# Patient Record
Sex: FEMALE | Race: WHITE | Employment: OTHER | URBAN - METROPOLITAN AREA
[De-identification: names, ages, dates, MRNs, and addresses within clinical notes are randomized per-mention and may not be internally consistent; named-entity substitution may affect disease eponyms.]

---

## 2018-05-16 ENCOUNTER — NURSE ONLY (OUTPATIENT)
Dept: NON INVASIVE DIAGNOSTICS | Age: 83
End: 2018-05-16
Payer: MEDICARE

## 2018-05-16 DIAGNOSIS — I48.0 PAROXYSMAL ATRIAL FIBRILLATION (HCC): Chronic | ICD-10-CM

## 2018-05-16 DIAGNOSIS — Z95.810 DUAL IMPLANTABLE CARDIOVERTER-DEFIBRILLATOR IN SITU: Primary | Chronic | ICD-10-CM

## 2018-05-16 DIAGNOSIS — I25.5 CARDIOMYOPATHY, ISCHEMIC: Chronic | ICD-10-CM

## 2018-05-16 PROCEDURE — 93296 REM INTERROG EVL PM/IDS: CPT | Performed by: INTERNAL MEDICINE

## 2018-05-16 PROCEDURE — 93297 REM INTERROG DEV EVAL ICPMS: CPT | Performed by: INTERNAL MEDICINE

## 2018-05-16 PROCEDURE — 93295 DEV INTERROG REMOTE 1/2/MLT: CPT | Performed by: INTERNAL MEDICINE

## 2018-05-23 ENCOUNTER — TELEPHONE (OUTPATIENT)
Dept: NON INVASIVE DIAGNOSTICS | Age: 83
End: 2018-05-23

## 2018-07-22 ENCOUNTER — APPOINTMENT (OUTPATIENT)
Dept: GENERAL RADIOLOGY | Age: 83
End: 2018-07-22
Payer: MEDICARE

## 2018-07-22 ENCOUNTER — HOSPITAL ENCOUNTER (EMERGENCY)
Age: 83
Discharge: HOME | End: 2018-07-22
Attending: EMERGENCY MEDICINE
Payer: MEDICARE

## 2018-07-22 VITALS
WEIGHT: 100 LBS | HEART RATE: 72 BPM | DIASTOLIC BLOOD PRESSURE: 66 MMHG | RESPIRATION RATE: 16 BRPM | BODY MASS INDEX: 20.16 KG/M2 | SYSTOLIC BLOOD PRESSURE: 123 MMHG | OXYGEN SATURATION: 96 % | HEIGHT: 59 IN | TEMPERATURE: 97.5 F

## 2018-07-22 DIAGNOSIS — N30.00 ACUTE CYSTITIS WITHOUT HEMATURIA: Primary | ICD-10-CM

## 2018-07-22 LAB
ANION GAP SERPL CALCULATED.3IONS-SCNC: 10 MMOL/L (ref 7–16)
BACTERIA: ABNORMAL /HPF
BILIRUBIN URINE: NEGATIVE
BLOOD, URINE: ABNORMAL
BUN BLDV-MCNC: 18 MG/DL (ref 8–23)
CALCIUM SERPL-MCNC: 8.6 MG/DL (ref 8.6–10.2)
CHLORIDE BLD-SCNC: 96 MMOL/L (ref 98–107)
CLARITY: CLEAR
CO2: 29 MMOL/L (ref 22–29)
COLOR: YELLOW
CREAT SERPL-MCNC: 0.7 MG/DL (ref 0.5–1)
DIGOXIN LEVEL: 0.8 NG/ML (ref 0.8–2)
EKG ATRIAL RATE: 64 BPM
EKG P-R INTERVAL: 246 MS
EKG Q-T INTERVAL: 372 MS
EKG QRS DURATION: 92 MS
EKG QTC CALCULATION (BAZETT): 377 MS
EKG R AXIS: 83 DEGREES
EKG T AXIS: 128 DEGREES
EKG VENTRICULAR RATE: 62 BPM
GFR AFRICAN AMERICAN: >60
GFR NON-AFRICAN AMERICAN: >60 ML/MIN/1.73
GLUCOSE BLD-MCNC: 116 MG/DL (ref 74–109)
GLUCOSE URINE: NEGATIVE MG/DL
HCT VFR BLD CALC: 38 % (ref 34–48)
HEMOGLOBIN: 12.4 G/DL (ref 11.5–15.5)
INR BLD: 2.6
KETONES, URINE: NEGATIVE MG/DL
LEUKOCYTE ESTERASE, URINE: ABNORMAL
MCH RBC QN AUTO: 29.2 PG (ref 26–35)
MCHC RBC AUTO-ENTMCNC: 32.6 % (ref 32–34.5)
MCV RBC AUTO: 89.6 FL (ref 80–99.9)
NITRITE, URINE: POSITIVE
PDW BLD-RTO: 14.3 FL (ref 11.5–15)
PH UA: 7 (ref 5–9)
PLATELET # BLD: 221 E9/L (ref 130–450)
PMV BLD AUTO: 9.4 FL (ref 7–12)
POTASSIUM SERPL-SCNC: 3.8 MMOL/L (ref 3.5–5)
PROTEIN UA: NEGATIVE MG/DL
PROTHROMBIN TIME: 30 SEC (ref 9.3–12.4)
RBC # BLD: 4.24 E12/L (ref 3.5–5.5)
RBC UA: ABNORMAL /HPF (ref 0–2)
SODIUM BLD-SCNC: 135 MMOL/L (ref 132–146)
SPECIFIC GRAVITY UA: 1.01 (ref 1–1.03)
UROBILINOGEN, URINE: 0.2 E.U./DL
WBC # BLD: 10.9 E9/L (ref 4.5–11.5)
WBC UA: >20 /HPF (ref 0–5)
YEAST: ABNORMAL

## 2018-07-22 PROCEDURE — 71045 X-RAY EXAM CHEST 1 VIEW: CPT

## 2018-07-22 PROCEDURE — 85610 PROTHROMBIN TIME: CPT

## 2018-07-22 PROCEDURE — 36415 COLL VENOUS BLD VENIPUNCTURE: CPT

## 2018-07-22 PROCEDURE — 99285 EMERGENCY DEPT VISIT HI MDM: CPT

## 2018-07-22 PROCEDURE — 81001 URINALYSIS AUTO W/SCOPE: CPT

## 2018-07-22 PROCEDURE — 6370000000 HC RX 637 (ALT 250 FOR IP): Performed by: STUDENT IN AN ORGANIZED HEALTH CARE EDUCATION/TRAINING PROGRAM

## 2018-07-22 PROCEDURE — 80162 ASSAY OF DIGOXIN TOTAL: CPT

## 2018-07-22 PROCEDURE — 2580000003 HC RX 258: Performed by: STUDENT IN AN ORGANIZED HEALTH CARE EDUCATION/TRAINING PROGRAM

## 2018-07-22 PROCEDURE — 80048 BASIC METABOLIC PNL TOTAL CA: CPT

## 2018-07-22 PROCEDURE — 85027 COMPLETE CBC AUTOMATED: CPT

## 2018-07-22 RX ORDER — SODIUM CHLORIDE 0.9 % (FLUSH) 0.9 %
10 SYRINGE (ML) INJECTION PRN
Status: DISCONTINUED | OUTPATIENT
Start: 2018-07-22 | End: 2018-07-23 | Stop reason: HOSPADM

## 2018-07-22 RX ORDER — SODIUM CHLORIDE 9 MG/ML
INJECTION, SOLUTION INTRAVENOUS ONCE
Status: COMPLETED | OUTPATIENT
Start: 2018-07-22 | End: 2018-07-22

## 2018-07-22 RX ORDER — CEFDINIR 300 MG/1
300 CAPSULE ORAL 2 TIMES DAILY
Qty: 14 CAPSULE | Refills: 0 | Status: SHIPPED | OUTPATIENT
Start: 2018-07-22 | End: 2018-07-29

## 2018-07-22 RX ORDER — CEFDINIR 300 MG/1
300 CAPSULE ORAL ONCE
Status: COMPLETED | OUTPATIENT
Start: 2018-07-22 | End: 2018-07-22

## 2018-07-22 RX ADMIN — CEFDINIR 300 MG: 300 CAPSULE ORAL at 23:38

## 2018-07-22 RX ADMIN — SODIUM CHLORIDE: 9 INJECTION, SOLUTION INTRAVENOUS at 21:45

## 2018-07-22 ASSESSMENT — ENCOUNTER SYMPTOMS
ABDOMINAL PAIN: 0
COLOR CHANGE: 0
VOMITING: 0
PHOTOPHOBIA: 0
DIARRHEA: 0
NAUSEA: 0
SHORTNESS OF BREATH: 0
COUGH: 0

## 2018-07-23 NOTE — ED PROVIDER NOTES
107 mmol/L    CO2 29 22 - 29 mmol/L    Anion Gap 10 7 - 16 mmol/L    Glucose 116 (H) 74 - 109 mg/dL    BUN 18 8 - 23 mg/dL    CREATININE 0.7 0.5 - 1.0 mg/dL    GFR Non-African American >60 >=60 mL/min/1.73    GFR African American >60     Calcium 8.6 8.6 - 10.2 mg/dL   Digoxin level   Result Value Ref Range    Digoxin Lvl 0.8 0.8 - 2.0 ng/mL   Protime-INR   Result Value Ref Range    Protime 30.0 (H) 9.3 - 12.4 sec    INR 2.6    Urinalysis with Microscopic   Result Value Ref Range    Color, UA Yellow Straw/Yellow    Clarity, UA Clear Clear    Glucose, Ur Negative Negative mg/dL    Bilirubin Urine Negative Negative    Ketones, Urine Negative Negative mg/dL    Specific Gravity, UA 1.010 1.005 - 1.030    Blood, Urine TRACE-LYSED Negative    pH, UA 7.0 5.0 - 9.0    Protein, UA Negative Negative mg/dL    Urobilinogen, Urine 0.2 <2.0 E.U./dL    Nitrite, Urine POSITIVE (A) Negative    Leukocyte Esterase, Urine LARGE (A) Negative    WBC, UA >20 0 - 5 /HPF    RBC, UA 1-3 0 - 2 /HPF    Bacteria, UA MODERATE (A) /HPF    Yeast, UA RARE    EKG 12 Lead   Result Value Ref Range    Ventricular Rate 60 BPM    Atrial Rate 47 BPM    QRS Duration 82 ms    Q-T Interval 392 ms    QTc Calculation (Bazett) 392 ms    P Axis 15 degrees    R Axis 70 degrees    T Axis 66 degrees       Radiology:  XR CHEST 1 VW   Final Result   Advanced degenerative changes lung parenchyma with some   discrete ill-defined patchy infiltrates of undetermined age. Can   further evaluate with a CT scan of the chest.          ------------------------- NURSING NOTES AND VITALS REVIEWED ---------------------------  Date / Time Roomed:  7/22/2018  7:46 PM  ED Bed Assignment:  05/05    The nursing notes within the ED encounter and vital signs as below have been reviewed.    /66   Pulse 72   Temp 97.5 °F (36.4 °C) (Oral)   Resp 16   Ht 4' 11\" (1.499 m)   Wt 100 lb (45.4 kg)   SpO2 96%   BMI 20.20 kg/m²   Oxygen Saturation Interpretation: Normal      ------------------------------------------ PROGRESS NOTES ------------------------------------------  10:39 PM  I have spoken with the patient and discussed todays results, in addition to providing specific details for the plan of care and counseling regarding the diagnosis and prognosis. Their questions are answered at this time and they are agreeable with the plan. I discussed at length with them reasons for immediate return here for re evaluation. They will followup with their primary care physician by calling their office tomorrow. --------------------------------- ADDITIONAL PROVIDER NOTES ---------------------------------  At this time the patient is without objective evidence of an acute process requiring hospitalization or inpatient management. They have remained hemodynamically stable throughout their entire ED visit and are stable for discharge with outpatient follow-up. The plan has been discussed in detail and they are aware of the specific conditions for emergent return, as well as the importance of follow-up. New Prescriptions    CEFDINIR (OMNICEF) 300 MG CAPSULE    Take 1 capsule by mouth 2 times daily for 7 days       Diagnosis:  1. Acute cystitis without hematuria        Disposition:  Patient's disposition: Discharge to home  Patient's condition is stable.        Eb Washington,   Resident  07/22/18 [id number]

## 2018-09-18 NOTE — PROGRESS NOTES
[facility] [place] Cardiac Electrophysiology Office Progress Note    Kaur Chapin  1935  Date of Service: 9/24/18  [address]  Electrophysiologist: Gill Yancey DO    SUBJECTIVE: Kaur Chapin presents to the office today for a 6 month follow-up and the medical management of PAF, Sotalol AAD therapy and ICD in situ. At our last visit we discussed the change in her code status to [facility]: no intubation or chest compressions and was agreeable to defibrillation and medications. We discussed her ICD/ICD therapies and if she decides no defibrillation we will turn off ICD therapies. She remains on Sotalol with a stable QTc. She is also on coumadin with no bleeding issues. She recently had a UTI but is feeling better. She is very this and the skin around her ICD is intact. Her grandson is present and they were instructed to be diligent in observing the skin surrounding the device. She currently denies angina, syncope, dyspnea on exertion, paroxysmal nocturnal dyspnea, ICD shock and palpitations. Patient Active Problem List    Diagnosis Date Noted    Colitis 02/04/2013    Numbness 11/13/2012    Hand pain 11/13/2012    Cardiomyopathy, ischemic 06/14/2011     Overview Note:     EF: 35%      MI (myocardial infarction) ([facility] 06/14/2011     Overview Note:     8/2009      Dual implantable cardioverter-defibrillator in situ 06/14/2011     Overview Note:     [address] [place] [address] , Serial # [id number] implantation 9/4/09   A) Atrial Lead: 5076, Serial # [id number]   B) RV lead: 4074, Serial # [id number]  replace inactive diagnosis      Paroxysmal atrial fibrillation ([facility] 06/14/2011     Overview Note:     1.  Chronic Sotalol Therapy      Anticoagulant long-term use 06/14/2011    Breast carcinoma ([facility] 06/14/2011    S/P bilateral mastectomy 06/14/2011    Thyroid cancer ([facility] 06/14/2011    S/P thyroidectomy 06/14/2011    Hypothyroidism 06/14/2011    HTN (hypertension), benign 06/14/2011    TIA normal.   ICD site: well healed, skin thin and intact, no noted abrasion/open areas    EKG: (9/24/18): SR, AP-VS, HR 62 bpm,  QTc: 406 ms.     2D echo 2/7/16:  Conclusions      Summary   Mildly reduced left ventricular systolic function   Ejection fraction biplane = 45%.   Prominent trabeculation   Apical hypokinesis   Normal left atrium.   Physiologic and/or trace, Mild mitral regurgitation   The aortic valve is trileaflet.   The aortic valve appears mildly sclerotic.   No pulmonary hypertension   No tamponade      Signature      ----------------------------------------------------------------   Electronically signed by Arthur Jj MD(Interpreting   physician) on 07/11/2016 07:30 AM   ----------------------------------------------------------------     M-Mode/2D Measurements & Calculations      LV Diastolic     LV Systolic Dimension: 2.2 cm   AV Cusp Separation: 1.7   Dimension: 2.9   LV Volume Diastolic: 95.7 ml    cmAO Root Dimension: 2.9   cm               LV Volume Systolic: 12.9 ml     cm   LV FS:24.1 %     LV EDV/LV EDV Index: 32.2 LL/94   LV PW Diastolic: XZ/Y^0AN ESV/LV ESV Index: 17.1   0.9 cm           ml/12ml/ m^2   LV PW Systolic:  EF Calculated: 46.9 %   1.2 cm           LV Mass Index: 58 l/min*m^2     LA/Aorta: 1.34   Septum   Diastolic: 1.2                                   LA volume/Index: 44.3 ml   cm               LVOT: 1.9 cm                    /56ZM/U^6   Septum Systolic:                                 RA Area: 10 cm^2   1.2 cm                                                    IVC Expiration: 1.6 cm   LV Mass: 84.31 g     Doppler Measurements & Calculations      MV Peak E-Wave:   AV Peak Velocity: 1.16 m/s    LVOT Peak Velocity: 0.83   0.75 m/s          AV Peak Gradient: 5.42 mmHg   m/s   MV Peak A-Wave:   AV Mean Velocity: 0.78 m/s    LVOT Mean Velocity: 0.49   0.94 m/s          AV Mean Gradient: 2.7 mmHg    m/s   MV E/A Ratio:     AV VTI: 22.5 cm               LVOT Peak Gradient: 2.7   0.81              AV Area (Continuity):1.85     mmHgLVOT Mean Gradient:   MV Peak Gradient: cm^2                          1.1 mmHg   2.8 mmHg                                        Estimated RVSP: 33.56 mmHg   MV Mean Gradient: LVOT VTI: 14.7 cm             Estimated RAP:3 mmHg   1.1 mmHg          IVRT: 101.5 msec   MV Mean Velocity: Estimated PADP: 10 mmHg   0.46 m/s          Pulm. Vein A Reversal         TR Velocity:2.76 m/s   MV Deceleration   Duration:138.4 msec           TR Gradient:30.56 mmHg   Time: 217.2 msec  Pulm. Vein D Velocity:0.45    PV Peak Velocity: 0.9 m/s   MV P1/2t: 55.9    m/sPulm. Vein A Reversal      PV Peak Gradient: 3.22   msec              Velocity:0.28 m/s             mmHg   MVA by PHT:3.94   Pulm. Vein S Velocity: 0.46   PV Mean Velocity: 0.56 m/s   cm^2              m/s                           PV Mean Gradient: 1.5 mmHg   MV Area   (continuity): 1.9                               MT ED Velocity: 1.35 m/s   cm^2   MV E' Septal   Velocity: 0.05   m/s   MV E' Lateral   Velocity: 0.04   m/s    Device Interrogation/Reprogramming  Make/Model Medtronic Evera XT. DOI: 8/25/16  Mode MVPR  60/120  P wave: 4.1 mV  Impedance: 399 ohms   Threshold: 0.5 V @ 0.4 ms  RV R wave: 10 mV  Impedance: 380 ohms   Threshold: 0.75 V @ 0.4 ms  Pacing: A: 95%  RV: <0.1%    Battery Voltage/Longevity: 3 V, 8.2 years, charge time 3.7 seconds   Arrhythmias: none  OptiVol: At baseline. Overall device function is normal  All device programmable settings were evaluated per above and in the scanned document, along with iterative adjustments (capture thresholds) to assess and select the most appropriate final programming to provide for consistent delivery of the appropriate therapy and to verify function of the device. Impression:    1. ICMP  - EF: 35%  - repeat echo 7/10/16:  Ejection fraction biplane = 45%. 2. Dual chamber ICD in situ  - Original implant: Medtronic.  DOI 9/4/09  - ICD generator change

## 2018-09-24 ENCOUNTER — OFFICE VISIT (OUTPATIENT)
Dept: NON INVASIVE DIAGNOSTICS | Age: 83
End: 2018-09-24
Payer: MEDICARE

## 2018-09-24 VITALS
HEART RATE: 62 BPM | HEIGHT: 59 IN | BODY MASS INDEX: 19.35 KG/M2 | RESPIRATION RATE: 16 BRPM | SYSTOLIC BLOOD PRESSURE: 130 MMHG | DIASTOLIC BLOOD PRESSURE: 80 MMHG | WEIGHT: 96 LBS

## 2018-09-24 DIAGNOSIS — Z95.810 DUAL IMPLANTABLE CARDIOVERTER-DEFIBRILLATOR IN SITU: Chronic | ICD-10-CM

## 2018-09-24 DIAGNOSIS — I48.0 PAROXYSMAL ATRIAL FIBRILLATION (HCC): Chronic | ICD-10-CM

## 2018-09-24 DIAGNOSIS — I25.5 CARDIOMYOPATHY, ISCHEMIC: Primary | Chronic | ICD-10-CM

## 2018-09-24 PROCEDURE — 1036F TOBACCO NON-USER: CPT | Performed by: NURSE PRACTITIONER

## 2018-09-24 PROCEDURE — 93290 INTERROG DEV EVAL ICPMS IP: CPT | Performed by: NURSE PRACTITIONER

## 2018-09-24 PROCEDURE — G8598 ASA/ANTIPLAT THER USED: HCPCS | Performed by: NURSE PRACTITIONER

## 2018-09-24 PROCEDURE — G8427 DOCREV CUR MEDS BY ELIG CLIN: HCPCS | Performed by: NURSE PRACTITIONER

## 2018-09-24 PROCEDURE — 1101F PT FALLS ASSESS-DOCD LE1/YR: CPT | Performed by: NURSE PRACTITIONER

## 2018-09-24 PROCEDURE — G8400 PT W/DXA NO RESULTS DOC: HCPCS | Performed by: NURSE PRACTITIONER

## 2018-09-24 PROCEDURE — 93283 PRGRMG EVAL IMPLANTABLE DFB: CPT | Performed by: NURSE PRACTITIONER

## 2018-09-24 PROCEDURE — 1123F ACP DISCUSS/DSCN MKR DOCD: CPT | Performed by: NURSE PRACTITIONER

## 2018-09-24 PROCEDURE — 99213 OFFICE O/P EST LOW 20 MIN: CPT | Performed by: NURSE PRACTITIONER

## 2018-09-24 PROCEDURE — 93000 ELECTROCARDIOGRAM COMPLETE: CPT | Performed by: INTERNAL MEDICINE

## 2018-09-24 PROCEDURE — G8420 CALC BMI NORM PARAMETERS: HCPCS | Performed by: NURSE PRACTITIONER

## 2018-09-24 PROCEDURE — 1090F PRES/ABSN URINE INCON ASSESS: CPT | Performed by: NURSE PRACTITIONER

## 2018-09-24 PROCEDURE — 4040F PNEUMOC VAC/ADMIN/RCVD: CPT | Performed by: NURSE PRACTITIONER

## 2018-09-24 ASSESSMENT — ENCOUNTER SYMPTOMS: RESPIRATORY NEGATIVE: 1

## 2018-11-12 ENCOUNTER — NURSE ONLY (OUTPATIENT)
Dept: NON INVASIVE DIAGNOSTICS | Age: 83
End: 2018-11-12
Payer: MEDICARE

## 2018-11-12 DIAGNOSIS — Z95.810 DUAL IMPLANTABLE CARDIOVERTER-DEFIBRILLATOR IN SITU: Primary | Chronic | ICD-10-CM

## 2018-11-12 DIAGNOSIS — I25.5 CARDIOMYOPATHY, ISCHEMIC: Chronic | ICD-10-CM

## 2018-11-12 DIAGNOSIS — I48.0 PAROXYSMAL ATRIAL FIBRILLATION (HCC): Chronic | ICD-10-CM

## 2018-11-12 PROCEDURE — 93295 DEV INTERROG REMOTE 1/2/MLT: CPT | Performed by: INTERNAL MEDICINE

## 2018-11-12 PROCEDURE — 93296 REM INTERROG EVL PM/IDS: CPT | Performed by: INTERNAL MEDICINE

## 2018-11-12 PROCEDURE — 93297 REM INTERROG DEV EVAL ICPMS: CPT | Performed by: INTERNAL MEDICINE

## 2018-11-16 ENCOUNTER — TELEPHONE (OUTPATIENT)
Dept: CARDIOLOGY CLINIC | Age: 83
End: 2018-11-16

## 2018-11-16 NOTE — PROGRESS NOTES
See [facility] [place] report. Remote monitoring reviewed over a 90 day period. End of 90 day monitoring period date of service 11.12.2018.

## 2019-02-11 ENCOUNTER — TELEPHONE (OUTPATIENT)
Dept: NON INVASIVE DIAGNOSTICS | Age: 84
End: 2019-02-11

## 2019-02-11 ENCOUNTER — NURSE ONLY (OUTPATIENT)
Dept: NON INVASIVE DIAGNOSTICS | Age: 84
End: 2019-02-11
Payer: MEDICARE

## 2019-02-11 DIAGNOSIS — Z95.810 DUAL IMPLANTABLE CARDIOVERTER-DEFIBRILLATOR IN SITU: Primary | Chronic | ICD-10-CM

## 2019-02-11 DIAGNOSIS — I25.5 CARDIOMYOPATHY, ISCHEMIC: Chronic | ICD-10-CM

## 2019-02-11 DIAGNOSIS — I48.0 PAROXYSMAL ATRIAL FIBRILLATION (HCC): Chronic | ICD-10-CM

## 2019-02-11 PROCEDURE — 93295 DEV INTERROG REMOTE 1/2/MLT: CPT | Performed by: INTERNAL MEDICINE

## 2019-02-11 PROCEDURE — 93297 REM INTERROG DEV EVAL ICPMS: CPT | Performed by: INTERNAL MEDICINE

## 2019-02-11 PROCEDURE — 93296 REM INTERROG EVL PM/IDS: CPT | Performed by: INTERNAL MEDICINE

## 2019-04-25 LAB
CHOLESTEROL, TOTAL: NORMAL
CHOLESTEROL/HDL RATIO: NORMAL
CREATININE: 0.8 MG/DL
HDLC SERPL-MCNC: NORMAL MG/DL
LDL CHOLESTEROL CALCULATED: NORMAL
POTASSIUM (K+): 4.2
TRIGL SERPL-MCNC: NORMAL MG/DL
VLDLC SERPL CALC-MCNC: NORMAL MG/DL

## 2019-05-13 ENCOUNTER — NURSE ONLY (OUTPATIENT)
Dept: NON INVASIVE DIAGNOSTICS | Age: 84
End: 2019-05-13
Payer: MEDICARE

## 2019-05-13 DIAGNOSIS — I25.5 CARDIOMYOPATHY, ISCHEMIC: ICD-10-CM

## 2019-05-13 DIAGNOSIS — Z95.810 DUAL IMPLANTABLE CARDIOVERTER-DEFIBRILLATOR IN SITU: Primary | ICD-10-CM

## 2019-05-13 DIAGNOSIS — I48.0 PAROXYSMAL ATRIAL FIBRILLATION (HCC): ICD-10-CM

## 2019-05-13 PROCEDURE — 93297 REM INTERROG DEV EVAL ICPMS: CPT | Performed by: INTERNAL MEDICINE

## 2019-05-13 PROCEDURE — 93295 DEV INTERROG REMOTE 1/2/MLT: CPT | Performed by: INTERNAL MEDICINE

## 2019-05-13 PROCEDURE — 93296 REM INTERROG EVL PM/IDS: CPT | Performed by: INTERNAL MEDICINE

## 2019-05-22 ENCOUNTER — TELEPHONE (OUTPATIENT)
Dept: NON INVASIVE DIAGNOSTICS | Age: 84
End: 2019-05-22

## 2019-05-22 NOTE — TELEPHONE ENCOUNTER
We have received your remote transmission. Our staff will contact you if there is anything that needs to be discussed. Your next appointment is 08/12/2019 remote transmission from home    Pt is wireless.

## 2019-05-22 NOTE — TELEPHONE ENCOUNTER
----- Message from Soledad Murphy RN sent at 5/22/2019  1:59 PM EDT -----  Successful transmission received. Please call patient and give next appointment.

## 2019-05-30 ENCOUNTER — OFFICE VISIT (OUTPATIENT)
Dept: FAMILY MEDICINE CLINIC | Age: 84
End: 2019-05-30
Payer: MEDICARE

## 2019-05-30 DIAGNOSIS — Z79.01 ANTICOAGULANT LONG-TERM USE: Primary | Chronic | ICD-10-CM

## 2019-05-30 DIAGNOSIS — I48.0 PAROXYSMAL ATRIAL FIBRILLATION (HCC): Chronic | ICD-10-CM

## 2019-05-30 DIAGNOSIS — F41.9 ANXIETY: ICD-10-CM

## 2019-05-30 LAB
INTERNATIONAL NORMALIZATION RATIO, POC: 2.4
PROTHROMBIN TIME, POC: 27.6

## 2019-05-30 PROCEDURE — 85610 PROTHROMBIN TIME: CPT | Performed by: INTERNAL MEDICINE

## 2019-05-30 PROCEDURE — 99213 OFFICE O/P EST LOW 20 MIN: CPT | Performed by: INTERNAL MEDICINE

## 2019-05-30 RX ORDER — WARFARIN SODIUM 1 MG/1
TABLET ORAL
Qty: 30 TABLET | Refills: 3 | Status: SHIPPED | OUTPATIENT
Start: 2019-05-30 | End: 2019-10-07 | Stop reason: SDUPTHER

## 2019-05-30 RX ORDER — DIGOXIN 125 MCG
125 TABLET ORAL DAILY
Qty: 30 TABLET | Refills: 5 | Status: SHIPPED | OUTPATIENT
Start: 2019-05-30 | End: 2019-12-10 | Stop reason: SDUPTHER

## 2019-05-30 RX ORDER — ROSUVASTATIN CALCIUM 20 MG/1
20 TABLET, COATED ORAL EVERY OTHER DAY
Qty: 30 TABLET | Refills: 0 | Status: SHIPPED | OUTPATIENT
Start: 2019-05-30 | End: 2019-12-30 | Stop reason: SDUPTHER

## 2019-05-30 RX ORDER — ALPRAZOLAM 0.25 MG/1
0.25 TABLET ORAL 2 TIMES DAILY
Qty: 60 TABLET | Refills: 2 | Status: SHIPPED | OUTPATIENT
Start: 2019-05-30 | End: 2019-06-29

## 2019-05-30 RX ORDER — ALPRAZOLAM 0.25 MG/1
0.25 TABLET ORAL 2 TIMES DAILY
Qty: 60 TABLET | Refills: 2 | Status: SHIPPED | OUTPATIENT
Start: 2019-05-30 | End: 2019-05-30 | Stop reason: SDUPTHER

## 2019-05-30 ASSESSMENT — PATIENT HEALTH QUESTIONNAIRE - PHQ9
1. LITTLE INTEREST OR PLEASURE IN DOING THINGS: 0
SUM OF ALL RESPONSES TO PHQ QUESTIONS 1-9: 0
SUM OF ALL RESPONSES TO PHQ QUESTIONS 1-9: 0
SUM OF ALL RESPONSES TO PHQ9 QUESTIONS 1 & 2: 0
2. FEELING DOWN, DEPRESSED OR HOPELESS: 0

## 2019-07-02 ENCOUNTER — ANTI-COAG VISIT (OUTPATIENT)
Dept: FAMILY MEDICINE CLINIC | Age: 84
End: 2019-07-02
Payer: MEDICARE

## 2019-07-02 VITALS
OXYGEN SATURATION: 94 % | BODY MASS INDEX: 19.32 KG/M2 | HEIGHT: 62 IN | SYSTOLIC BLOOD PRESSURE: 118 MMHG | HEART RATE: 84 BPM | DIASTOLIC BLOOD PRESSURE: 70 MMHG | WEIGHT: 105 LBS

## 2019-07-02 DIAGNOSIS — Z79.01 ANTICOAGULANT LONG-TERM USE: Chronic | ICD-10-CM

## 2019-07-02 DIAGNOSIS — I48.0 PAROXYSMAL ATRIAL FIBRILLATION (HCC): Primary | Chronic | ICD-10-CM

## 2019-07-02 LAB
INTERNATIONAL NORMALIZATION RATIO, POC: 3.1
PROTHROMBIN TIME, POC: 37

## 2019-07-02 PROCEDURE — 85610 PROTHROMBIN TIME: CPT | Performed by: INTERNAL MEDICINE

## 2019-07-02 PROCEDURE — 99212 OFFICE O/P EST SF 10 MIN: CPT | Performed by: INTERNAL MEDICINE

## 2019-08-08 ENCOUNTER — ANTI-COAG VISIT (OUTPATIENT)
Dept: FAMILY MEDICINE CLINIC | Age: 84
End: 2019-08-08
Payer: MEDICARE

## 2019-08-08 VITALS
HEIGHT: 63 IN | OXYGEN SATURATION: 96 % | WEIGHT: 104.6 LBS | HEART RATE: 88 BPM | BODY MASS INDEX: 18.54 KG/M2 | SYSTOLIC BLOOD PRESSURE: 112 MMHG | TEMPERATURE: 97.2 F | DIASTOLIC BLOOD PRESSURE: 72 MMHG

## 2019-08-08 DIAGNOSIS — I10 HTN (HYPERTENSION), BENIGN: Chronic | ICD-10-CM

## 2019-08-08 DIAGNOSIS — I48.0 PAROXYSMAL ATRIAL FIBRILLATION (HCC): Primary | Chronic | ICD-10-CM

## 2019-08-08 LAB — INTERNATIONAL NORMALIZATION RATIO, POC: 2.8

## 2019-08-08 PROCEDURE — 85610 PROTHROMBIN TIME: CPT | Performed by: INTERNAL MEDICINE

## 2019-08-08 PROCEDURE — 99213 OFFICE O/P EST LOW 20 MIN: CPT | Performed by: INTERNAL MEDICINE

## 2019-08-08 RX ORDER — ALPRAZOLAM 0.25 MG/1
TABLET ORAL
COMMUNITY
Start: 2019-08-07 | End: 2019-09-05 | Stop reason: SDUPTHER

## 2019-08-08 RX ORDER — LEVOTHYROXINE SODIUM 0.07 MG/1
75 TABLET ORAL DAILY
Qty: 90 TABLET | Refills: 1 | Status: SHIPPED | OUTPATIENT
Start: 2019-08-08 | End: 2019-12-30 | Stop reason: SDUPTHER

## 2019-08-08 RX ORDER — SOTALOL HYDROCHLORIDE 80 MG/1
80 TABLET ORAL 2 TIMES DAILY
Qty: 180 TABLET | Refills: 1 | Status: SHIPPED | OUTPATIENT
Start: 2019-08-08 | End: 2019-12-30 | Stop reason: SDUPTHER

## 2019-08-08 NOTE — PROGRESS NOTES
2019     Mitchell Begum (:  1935) is a 80 y.o. female, resents for medication refills today and also recheck of her INR. States her sinuses have been doing fine with her allergic rhinitis. They do have questions about using CBD oil for her arthritis which I told her would be fine topically. Review of Systems    Prior to Visit Medications    Medication Sig Taking? Authorizing Provider   ALPRAZolam Shamar Juárez) 0.25 MG tablet   Historical Provider, MD   rosuvastatin (CRESTOR) 20 MG tablet Take 1 tablet by mouth every other day  Neill Felty,    digoxin (LANOXIN) 125 MCG tablet Take 1 tablet by mouth daily  Neill Felty, DO   warfarin (COUMADIN) 1 MG tablet As directed by pcp  Neill Felty, DO   lisinopril (PRINIVIL;ZESTRIL) 2.5 MG tablet Take 2.5 mg by mouth 2 times daily   Historical Provider, MD   levothyroxine (SYNTHROID) 75 MCG tablet Take 75 mcg by mouth daily.     Historical Provider, MD   sotalol (BETAPACE) 80 MG tablet Take 80 mg by mouth 2 times daily   Historical Provider, MD   furosemide (LASIX) 20 MG tablet Take 20 mg by mouth daily   Historical Provider, MD      Allergies   Allergen Reactions    Fluarix Quadrivalent  [Influenza Vac Split Quad]     Ipratropium     Pcn [Penicillins]     Pregabalin Other (See Comments)     confusion    Codeine Nausea And Vomiting       Past Medical History:   Diagnosis Date    Arm pain     lower arms    Atrial fibrillation (HCC)     Cancer ([facility]     bilateral breasts and thyroid    Cardiac defibrillator in place 2009    Dr. Xena Johnston [phone]    CHF (congestive heart failure) ([facility]     Hand pain     bilateral    Hypothyroidism     Ischemic cardiomyopathy     Numbness and tingling     arms and hands    Stroke [facility])      Past Surgical History:   Procedure Laterality Date    APPENDECTOMY      BREAST SURGERY      CARDIAC DEFIBRILLATOR PLACEMENT  2016    Medtronic Dual chamber ICD gen change

## 2019-08-12 ENCOUNTER — NURSE ONLY (OUTPATIENT)
Dept: NON INVASIVE DIAGNOSTICS | Age: 84
End: 2019-08-12
Payer: MEDICARE

## 2019-08-12 DIAGNOSIS — Z95.810 DUAL IMPLANTABLE CARDIOVERTER-DEFIBRILLATOR IN SITU: Primary | ICD-10-CM

## 2019-08-12 DIAGNOSIS — I25.5 CARDIOMYOPATHY, ISCHEMIC: ICD-10-CM

## 2019-08-12 PROCEDURE — 93296 REM INTERROG EVL PM/IDS: CPT | Performed by: INTERNAL MEDICINE

## 2019-08-12 PROCEDURE — 93297 REM INTERROG DEV EVAL ICPMS: CPT | Performed by: INTERNAL MEDICINE

## 2019-08-12 PROCEDURE — 93295 DEV INTERROG REMOTE 1/2/MLT: CPT | Performed by: INTERNAL MEDICINE

## 2019-08-26 ENCOUNTER — TELEPHONE (OUTPATIENT)
Dept: NON INVASIVE DIAGNOSTICS | Age: 84
End: 2019-08-26

## 2019-09-05 DIAGNOSIS — F41.9 ANXIETY: Primary | ICD-10-CM

## 2019-09-05 RX ORDER — ALPRAZOLAM 0.25 MG/1
TABLET ORAL
Qty: 60 TABLET | Refills: 0 | Status: SHIPPED | OUTPATIENT
Start: 2019-09-05 | End: 2019-10-05

## 2019-09-09 ENCOUNTER — ANTI-COAG VISIT (OUTPATIENT)
Dept: FAMILY MEDICINE CLINIC | Age: 84
End: 2019-09-09
Payer: MEDICARE

## 2019-09-09 VITALS
WEIGHT: 103.2 LBS | DIASTOLIC BLOOD PRESSURE: 60 MMHG | BODY MASS INDEX: 18.28 KG/M2 | OXYGEN SATURATION: 97 % | TEMPERATURE: 97 F | HEART RATE: 63 BPM | SYSTOLIC BLOOD PRESSURE: 106 MMHG

## 2019-09-09 DIAGNOSIS — I10 HTN (HYPERTENSION), BENIGN: Primary | Chronic | ICD-10-CM

## 2019-09-09 DIAGNOSIS — I48.0 PAROXYSMAL ATRIAL FIBRILLATION (HCC): Chronic | ICD-10-CM

## 2019-09-09 LAB
INTERNATIONAL NORMALIZATION RATIO, POC: 3.2
PROTHROMBIN TIME, POC: 33.1

## 2019-09-09 PROCEDURE — 99212 OFFICE O/P EST SF 10 MIN: CPT | Performed by: INTERNAL MEDICINE

## 2019-09-09 PROCEDURE — 85610 PROTHROMBIN TIME: CPT | Performed by: INTERNAL MEDICINE

## 2019-09-09 NOTE — PROGRESS NOTES
Ischemic cardiomyopathy     Myocardial infarction ([facility]     SEPTAL AND INFERIOR WALL, DIMINISHED EF 35%    Numbness and tingling     arms and hands    Stroke (HCC)     TIA (transient ischemic attack)      Past Surgical History:   Procedure Laterality Date    APPENDECTOMY      BREAST SURGERY      CARDIAC DEFIBRILLATOR PLACEMENT  08/25/2016    Medtronic Dual chamber ICD gen change    CHOLECYSTECTOMY      EYE SURGERY  2007    bilateral cataracts    GALLBLADDER SURGERY      PACEMAKER INSERTION  09/04/2009    Dr. Landers Ends [facility])    THYROID SURGERY      TONSILLECTOMY        Social History     Tobacco Use    Smoking status: Never Smoker    Smokeless tobacco: Never Used   Substance Use Topics    Alcohol use: No        Vitals:    09/09/19 1204   BP: 106/60   Pulse: 63   Temp: 97 °F (36.1 °C)   SpO2: 97%   Weight: 103 lb 3.2 oz (46.8 kg)     Estimated body mass index is 18.28 kg/m² as calculated from the following:    Height as of 8/8/19: 5' 3\" (1.6 m). Weight as of this encounter: 103 lb 3.2 oz (46.8 kg). Physical Exam     Heart is atrial fibrillation and flutter without any gallops or clicks. Lungs are clear to auscultation without any wheezes rales or rhonchi. No edema noted of the upper and lower extremities. She has not lost any additional weight this month. ASSESSMENT/PLAN:    ICD-10-CM    1. Paroxysmal atrial fibrillation (HCC) I48.0 POCT INR      Refuses flu shot because she had a previous allergy to this. Check her INR in 1 month. Refill the Xanax was already given last week after a recheck of theOARRS   No follow-ups on file. An electronic signature was used to authenticate this note.     --Evie Longo DO on 9/9/2019 at 12:26 PM

## 2019-10-07 ENCOUNTER — OFFICE VISIT (OUTPATIENT)
Dept: FAMILY MEDICINE CLINIC | Age: 84
End: 2019-10-07
Payer: MEDICARE

## 2019-10-07 VITALS
DIASTOLIC BLOOD PRESSURE: 70 MMHG | BODY MASS INDEX: 17.49 KG/M2 | SYSTOLIC BLOOD PRESSURE: 134 MMHG | OXYGEN SATURATION: 93 % | HEART RATE: 76 BPM | WEIGHT: 105 LBS | HEIGHT: 65 IN

## 2019-10-07 DIAGNOSIS — F41.9 ANXIETY: ICD-10-CM

## 2019-10-07 DIAGNOSIS — I48.0 PAROXYSMAL ATRIAL FIBRILLATION (HCC): Primary | Chronic | ICD-10-CM

## 2019-10-07 DIAGNOSIS — Z79.01 ANTICOAGULANT LONG-TERM USE: Chronic | ICD-10-CM

## 2019-10-07 LAB — INTERNATIONAL NORMALIZATION RATIO, POC: 2.4

## 2019-10-07 PROCEDURE — G8400 PT W/DXA NO RESULTS DOC: HCPCS | Performed by: INTERNAL MEDICINE

## 2019-10-07 PROCEDURE — 1090F PRES/ABSN URINE INCON ASSESS: CPT | Performed by: INTERNAL MEDICINE

## 2019-10-07 PROCEDURE — 4040F PNEUMOC VAC/ADMIN/RCVD: CPT | Performed by: INTERNAL MEDICINE

## 2019-10-07 PROCEDURE — 85610 PROTHROMBIN TIME: CPT | Performed by: INTERNAL MEDICINE

## 2019-10-07 PROCEDURE — G8598 ASA/ANTIPLAT THER USED: HCPCS | Performed by: INTERNAL MEDICINE

## 2019-10-07 PROCEDURE — G8427 DOCREV CUR MEDS BY ELIG CLIN: HCPCS | Performed by: INTERNAL MEDICINE

## 2019-10-07 PROCEDURE — 99213 OFFICE O/P EST LOW 20 MIN: CPT | Performed by: INTERNAL MEDICINE

## 2019-10-07 PROCEDURE — 1036F TOBACCO NON-USER: CPT | Performed by: INTERNAL MEDICINE

## 2019-10-07 PROCEDURE — G8484 FLU IMMUNIZE NO ADMIN: HCPCS | Performed by: INTERNAL MEDICINE

## 2019-10-07 PROCEDURE — G8419 CALC BMI OUT NRM PARAM NOF/U: HCPCS | Performed by: INTERNAL MEDICINE

## 2019-10-07 PROCEDURE — 1123F ACP DISCUSS/DSCN MKR DOCD: CPT | Performed by: INTERNAL MEDICINE

## 2019-10-07 RX ORDER — ALPRAZOLAM 0.25 MG/1
0.25 TABLET ORAL 2 TIMES DAILY
COMMUNITY
End: 2019-10-07 | Stop reason: SDUPTHER

## 2019-10-07 RX ORDER — ALPRAZOLAM 0.25 MG/1
0.25 TABLET ORAL 2 TIMES DAILY
Qty: 60 TABLET | Refills: 2 | Status: SHIPPED | OUTPATIENT
Start: 2019-10-07 | End: 2019-11-18 | Stop reason: SDUPTHER

## 2019-10-07 RX ORDER — WARFARIN SODIUM 1 MG/1
TABLET ORAL
Qty: 90 TABLET | Refills: 2 | Status: SHIPPED | OUTPATIENT
Start: 2019-10-07 | End: 2019-12-30 | Stop reason: SDUPTHER

## 2019-11-11 ENCOUNTER — NURSE ONLY (OUTPATIENT)
Dept: NON INVASIVE DIAGNOSTICS | Age: 84
End: 2019-11-11
Payer: MEDICARE

## 2019-11-11 DIAGNOSIS — Z95.810 DUAL IMPLANTABLE CARDIOVERTER-DEFIBRILLATOR IN SITU: Primary | ICD-10-CM

## 2019-11-11 DIAGNOSIS — I48.0 PAROXYSMAL ATRIAL FIBRILLATION (HCC): ICD-10-CM

## 2019-11-11 DIAGNOSIS — I25.5 CARDIOMYOPATHY, ISCHEMIC: ICD-10-CM

## 2019-11-11 PROCEDURE — 93295 DEV INTERROG REMOTE 1/2/MLT: CPT | Performed by: INTERNAL MEDICINE

## 2019-11-11 PROCEDURE — 93296 REM INTERROG EVL PM/IDS: CPT | Performed by: INTERNAL MEDICINE

## 2019-11-18 ENCOUNTER — OFFICE VISIT (OUTPATIENT)
Dept: FAMILY MEDICINE CLINIC | Age: 84
End: 2019-11-18
Payer: MEDICARE

## 2019-11-18 VITALS
SYSTOLIC BLOOD PRESSURE: 128 MMHG | OXYGEN SATURATION: 97 % | HEART RATE: 78 BPM | TEMPERATURE: 97.9 F | BODY MASS INDEX: 17.93 KG/M2 | DIASTOLIC BLOOD PRESSURE: 72 MMHG | WEIGHT: 105 LBS | HEIGHT: 64 IN

## 2019-11-18 DIAGNOSIS — F41.9 ANXIETY: ICD-10-CM

## 2019-11-18 DIAGNOSIS — I48.0 PAROXYSMAL ATRIAL FIBRILLATION (HCC): Primary | ICD-10-CM

## 2019-11-18 DIAGNOSIS — E78.2 MIXED HYPERLIPIDEMIA: ICD-10-CM

## 2019-11-18 DIAGNOSIS — I10 HTN (HYPERTENSION), BENIGN: ICD-10-CM

## 2019-11-18 LAB — INTERNATIONAL NORMALIZATION RATIO, POC: 3.2

## 2019-11-18 PROCEDURE — 1123F ACP DISCUSS/DSCN MKR DOCD: CPT | Performed by: INTERNAL MEDICINE

## 2019-11-18 PROCEDURE — 99213 OFFICE O/P EST LOW 20 MIN: CPT | Performed by: INTERNAL MEDICINE

## 2019-11-18 PROCEDURE — 1090F PRES/ABSN URINE INCON ASSESS: CPT | Performed by: INTERNAL MEDICINE

## 2019-11-18 PROCEDURE — G8427 DOCREV CUR MEDS BY ELIG CLIN: HCPCS | Performed by: INTERNAL MEDICINE

## 2019-11-18 PROCEDURE — G8400 PT W/DXA NO RESULTS DOC: HCPCS | Performed by: INTERNAL MEDICINE

## 2019-11-18 PROCEDURE — 1036F TOBACCO NON-USER: CPT | Performed by: INTERNAL MEDICINE

## 2019-11-18 PROCEDURE — 85610 PROTHROMBIN TIME: CPT | Performed by: INTERNAL MEDICINE

## 2019-11-18 PROCEDURE — 4040F PNEUMOC VAC/ADMIN/RCVD: CPT | Performed by: INTERNAL MEDICINE

## 2019-11-18 PROCEDURE — G8484 FLU IMMUNIZE NO ADMIN: HCPCS | Performed by: INTERNAL MEDICINE

## 2019-11-18 PROCEDURE — G8419 CALC BMI OUT NRM PARAM NOF/U: HCPCS | Performed by: INTERNAL MEDICINE

## 2019-11-18 PROCEDURE — G8598 ASA/ANTIPLAT THER USED: HCPCS | Performed by: INTERNAL MEDICINE

## 2019-11-18 RX ORDER — ALPRAZOLAM 0.25 MG/1
0.25 TABLET ORAL 2 TIMES DAILY
Qty: 60 TABLET | Refills: 2 | Status: SHIPPED | OUTPATIENT
Start: 2019-11-18 | End: 2019-12-30 | Stop reason: SDUPTHER

## 2019-12-10 RX ORDER — DIGOXIN 125 MCG
125 TABLET ORAL DAILY
Qty: 30 TABLET | Refills: 0 | Status: SHIPPED | OUTPATIENT
Start: 2019-12-10 | End: 2019-12-30 | Stop reason: SDUPTHER

## 2019-12-23 RX ORDER — FUROSEMIDE 20 MG/1
20 TABLET ORAL DAILY
Qty: 60 TABLET | Refills: 1 | Status: SHIPPED
Start: 2019-12-23 | End: 2020-02-20

## 2019-12-30 ENCOUNTER — OFFICE VISIT (OUTPATIENT)
Dept: FAMILY MEDICINE CLINIC | Age: 84
End: 2019-12-30
Payer: MEDICARE

## 2019-12-30 VITALS — SYSTOLIC BLOOD PRESSURE: 120 MMHG | HEART RATE: 77 BPM | OXYGEN SATURATION: 92 % | DIASTOLIC BLOOD PRESSURE: 72 MMHG

## 2019-12-30 DIAGNOSIS — F41.9 ANXIETY: ICD-10-CM

## 2019-12-30 DIAGNOSIS — Z79.01 ANTICOAGULANT LONG-TERM USE: Chronic | ICD-10-CM

## 2019-12-30 DIAGNOSIS — I48.0 PAROXYSMAL ATRIAL FIBRILLATION (HCC): Primary | Chronic | ICD-10-CM

## 2019-12-30 LAB — INTERNATIONAL NORMALIZATION RATIO, POC: 3.7

## 2019-12-30 PROCEDURE — G8427 DOCREV CUR MEDS BY ELIG CLIN: HCPCS | Performed by: INTERNAL MEDICINE

## 2019-12-30 PROCEDURE — G8419 CALC BMI OUT NRM PARAM NOF/U: HCPCS | Performed by: INTERNAL MEDICINE

## 2019-12-30 PROCEDURE — 99213 OFFICE O/P EST LOW 20 MIN: CPT | Performed by: INTERNAL MEDICINE

## 2019-12-30 PROCEDURE — 4040F PNEUMOC VAC/ADMIN/RCVD: CPT | Performed by: INTERNAL MEDICINE

## 2019-12-30 PROCEDURE — 1090F PRES/ABSN URINE INCON ASSESS: CPT | Performed by: INTERNAL MEDICINE

## 2019-12-30 PROCEDURE — G8400 PT W/DXA NO RESULTS DOC: HCPCS | Performed by: INTERNAL MEDICINE

## 2019-12-30 PROCEDURE — 1123F ACP DISCUSS/DSCN MKR DOCD: CPT | Performed by: INTERNAL MEDICINE

## 2019-12-30 PROCEDURE — G8598 ASA/ANTIPLAT THER USED: HCPCS | Performed by: INTERNAL MEDICINE

## 2019-12-30 PROCEDURE — G8484 FLU IMMUNIZE NO ADMIN: HCPCS | Performed by: INTERNAL MEDICINE

## 2019-12-30 PROCEDURE — 85610 PROTHROMBIN TIME: CPT | Performed by: INTERNAL MEDICINE

## 2019-12-30 PROCEDURE — 1036F TOBACCO NON-USER: CPT | Performed by: INTERNAL MEDICINE

## 2019-12-30 RX ORDER — WARFARIN SODIUM 1 MG/1
TABLET ORAL
Qty: 90 TABLET | Refills: 2 | Status: SHIPPED
Start: 2019-12-30 | End: 2020-09-15 | Stop reason: SDUPTHER

## 2019-12-30 RX ORDER — ALPRAZOLAM 0.25 MG/1
0.25 TABLET ORAL 2 TIMES DAILY
Qty: 60 TABLET | Refills: 2 | Status: SHIPPED | OUTPATIENT
Start: 2019-12-30 | End: 2020-05-11 | Stop reason: SDUPTHER

## 2019-12-30 RX ORDER — SOTALOL HYDROCHLORIDE 80 MG/1
80 TABLET ORAL 2 TIMES DAILY
Qty: 180 TABLET | Refills: 1 | Status: SHIPPED
Start: 2019-12-30 | End: 2020-02-26 | Stop reason: DRUGHIGH

## 2019-12-30 RX ORDER — DIGOXIN 125 MCG
125 TABLET ORAL DAILY
Qty: 30 TABLET | Refills: 2 | Status: SHIPPED
Start: 2019-12-30 | End: 2020-03-31

## 2019-12-30 RX ORDER — ROSUVASTATIN CALCIUM 20 MG/1
20 TABLET, COATED ORAL EVERY OTHER DAY
Qty: 45 TABLET | Refills: 1 | Status: SHIPPED | OUTPATIENT
Start: 2019-12-30 | End: 2020-01-30 | Stop reason: SDUPTHER

## 2019-12-30 RX ORDER — LEVOTHYROXINE SODIUM 0.07 MG/1
75 TABLET ORAL DAILY
Qty: 90 TABLET | Refills: 1 | Status: SHIPPED
Start: 2019-12-30 | End: 2020-07-02

## 2020-01-30 ENCOUNTER — OFFICE VISIT (OUTPATIENT)
Dept: FAMILY MEDICINE CLINIC | Age: 85
End: 2020-01-30
Payer: MEDICARE

## 2020-01-30 VITALS
OXYGEN SATURATION: 94 % | SYSTOLIC BLOOD PRESSURE: 118 MMHG | WEIGHT: 106 LBS | BODY MASS INDEX: 18.1 KG/M2 | HEART RATE: 86 BPM | DIASTOLIC BLOOD PRESSURE: 78 MMHG | HEIGHT: 64 IN | RESPIRATION RATE: 18 BRPM | TEMPERATURE: 97.9 F

## 2020-01-30 LAB — INTERNATIONAL NORMALIZATION RATIO, POC: 2.7

## 2020-01-30 PROCEDURE — G8400 PT W/DXA NO RESULTS DOC: HCPCS | Performed by: INTERNAL MEDICINE

## 2020-01-30 PROCEDURE — 1036F TOBACCO NON-USER: CPT | Performed by: INTERNAL MEDICINE

## 2020-01-30 PROCEDURE — G8484 FLU IMMUNIZE NO ADMIN: HCPCS | Performed by: INTERNAL MEDICINE

## 2020-01-30 PROCEDURE — 85610 PROTHROMBIN TIME: CPT | Performed by: INTERNAL MEDICINE

## 2020-01-30 PROCEDURE — 1090F PRES/ABSN URINE INCON ASSESS: CPT | Performed by: INTERNAL MEDICINE

## 2020-01-30 PROCEDURE — 99213 OFFICE O/P EST LOW 20 MIN: CPT | Performed by: INTERNAL MEDICINE

## 2020-01-30 PROCEDURE — 4040F PNEUMOC VAC/ADMIN/RCVD: CPT | Performed by: INTERNAL MEDICINE

## 2020-01-30 PROCEDURE — 1123F ACP DISCUSS/DSCN MKR DOCD: CPT | Performed by: INTERNAL MEDICINE

## 2020-01-30 PROCEDURE — G8427 DOCREV CUR MEDS BY ELIG CLIN: HCPCS | Performed by: INTERNAL MEDICINE

## 2020-01-30 PROCEDURE — G8419 CALC BMI OUT NRM PARAM NOF/U: HCPCS | Performed by: INTERNAL MEDICINE

## 2020-01-30 RX ORDER — AZELASTINE HYDROCHLORIDE 0.5 MG/ML
1 SOLUTION/ DROPS OPHTHALMIC 2 TIMES DAILY
Qty: 1 BOTTLE | Refills: 3 | Status: SHIPPED
Start: 2020-01-30 | End: 2020-02-26 | Stop reason: SINTOL

## 2020-01-30 RX ORDER — ROSUVASTATIN CALCIUM 20 MG/1
20 TABLET, COATED ORAL EVERY OTHER DAY
Qty: 45 TABLET | Refills: 1 | Status: SHIPPED
Start: 2020-01-30 | End: 2020-07-02

## 2020-01-30 RX ORDER — LISINOPRIL 2.5 MG/1
2.5 TABLET ORAL 2 TIMES DAILY
Qty: 30 TABLET | Refills: 0 | Status: SHIPPED
Start: 2020-01-30 | End: 2020-04-13

## 2020-01-30 NOTE — PROGRESS NOTES
2020     Jeannie Ayala (:  1935) is a 80 y.o. female, is today because of itchy dry eyes. She says she gets a crusting from the eyes as clear to yellow in color. No fevers or chills. She has had this on and off her entire life. She presents also for a recheck of her INR and for the atrial fib. Denies any shortness of breath or chest pain. She is requesting refill of medications. Chief Complaint   Patient presents with   [address] [place] [address] Office Visit for Anticoagulation Management         Review of Systems    Prior to Visit Medications    Medication Sig Taking? Authorizing Provider   rosuvastatin (CRESTOR) 20 MG tablet Take 1 tablet by mouth every other day Yes Ilsa Little, DO   lisinopril (PRINIVIL;ZESTRIL) 2.5 MG tablet Take 1 tablet by mouth 2 times daily Yes Ilsa Little, DO   azelastine (OPTIVAR) 0.05 % ophthalmic solution Place 1 drop into both eyes 2 times daily Yes Ilsa Lópeza, DO   levothyroxine (SYNTHROID) 75 MCG tablet Take 1 tablet by mouth daily Yes Ilsa Lópeza, DO   digoxin (LANOXIN) 125 MCG tablet Take 1 tablet by mouth daily Yes Ilsa Little, DO   warfarin (COUMADIN) 1 MG tablet As directed by pcp Yes Ilsa Little, DO   ALPRAZolam Rajat Gee) 0.25 MG tablet Take 1 tablet by mouth 2 times daily for 90 days.  Yes Ilsa Lópeza, DO   sotalol (BETAPACE) 80 MG tablet Take 1 tablet by mouth 2 times daily Yes Ilsa Lópeza, DO   furosemide (LASIX) 20 MG tablet Take 1 tablet by mouth daily Indications: take 1-2 qd Yes Ilsa Anabel DO   Handicap Placard MISC by Does not apply route DURATION 5 YEARS Yes Historical Provider, MD      Allergies   Allergen Reactions    Fluarix Quadrivalent  [Influenza Vac Split Quad]     Ipratropium     Pcn [Penicillins]     Pregabalin Other (See Comments)     confusion    Codeine Nausea And Vomiting       Past Medical History:   Diagnosis Date    Arm pain     lower arms cobblestoning but yet she has some minimal clear drainage as well as yellowish clear crusting in the medial corners of her eyes. Cardiovascular:      Rate and Rhythm: Normal rate. Rhythm irregular. Pulmonary:      Effort: Pulmonary effort is normal.      Breath sounds: Normal breath sounds. No wheezing, rhonchi or rales. Neurological:      Mental Status: She is alert. INR is 2.7   ASSESSMENT/PLAN:  Chelsea Garza was seen today for 1 month follow-up and office visit for anticoagulation management. Diagnoses and all orders for this visit:    Paroxysmal atrial fibrillation ([address]    Encounter for monitoring warfarin therapy    Anxiety    Other orders  -     POCT INR  -     rosuvastatin (CRESTOR) 20 MG tablet; Take 1 tablet by mouth every other day  -     lisinopril (PRINIVIL;ZESTRIL) 2.5 MG tablet; Take 1 tablet by mouth 2 times daily  -     azelastine (OPTIVAR) 0.05 % ophthalmic solution; Place 1 drop into both eyes 2 times daily       Refill of her medications given today. I have also given her Optivar for the allergic conjunctivitis. Her INR is stable and does not require any additional changes to her warfarin dosing. No follow-ups on file. An electronic signature was used to authenticate this note.     --[facility] on 1/31/2020 at 10:24 AM

## 2020-02-10 ENCOUNTER — NURSE ONLY (OUTPATIENT)
Dept: NON INVASIVE DIAGNOSTICS | Age: 85
End: 2020-02-10
Payer: MEDICARE

## 2020-02-10 PROCEDURE — 93295 DEV INTERROG REMOTE 1/2/MLT: CPT | Performed by: INTERNAL MEDICINE

## 2020-02-10 PROCEDURE — 93296 REM INTERROG EVL PM/IDS: CPT | Performed by: INTERNAL MEDICINE

## 2020-02-13 ENCOUNTER — HOSPITAL ENCOUNTER (OUTPATIENT)
Age: 85
Discharge: HOME | End: 2020-02-15
Payer: MEDICARE

## 2020-02-13 LAB
ALBUMIN SERPL-MCNC: 3.8 G/DL (ref 3.5–5.2)
ALP BLD-CCNC: 231 U/L (ref 35–104)
ALT SERPL-CCNC: 14 U/L (ref 0–32)
ANION GAP SERPL CALCULATED.3IONS-SCNC: 15 MMOL/L (ref 7–16)
AST SERPL-CCNC: 24 U/L (ref 0–31)
BASOPHILS ABSOLUTE: 0.06 E9/L (ref 0–0.2)
BASOPHILS RELATIVE PERCENT: 0.8 % (ref 0–2)
BILIRUB SERPL-MCNC: 0.5 MG/DL (ref 0–1.2)
BILIRUBIN DIRECT: <0.2 MG/DL (ref 0–0.3)
BILIRUBIN, INDIRECT: ABNORMAL MG/DL (ref 0–1)
BUN BLDV-MCNC: 14 MG/DL (ref 8–23)
CALCIUM SERPL-MCNC: 9 MG/DL (ref 8.6–10.2)
CHLORIDE BLD-SCNC: 99 MMOL/L (ref 98–107)
CHOLESTEROL, TOTAL: 112 MG/DL (ref 0–199)
CO2: 27 MMOL/L (ref 22–29)
CREAT SERPL-MCNC: 0.9 MG/DL (ref 0.5–1)
DIGOXIN LEVEL: 0.7 NG/ML (ref 0.8–2)
EOSINOPHILS ABSOLUTE: 0 E9/L (ref 0.05–0.5)
EOSINOPHILS RELATIVE PERCENT: 0 % (ref 0–6)
GFR AFRICAN AMERICAN: >60
GFR NON-AFRICAN AMERICAN: 60 ML/MIN/1.73
GLUCOSE BLD-MCNC: 96 MG/DL (ref 74–99)
HCT VFR BLD CALC: 45.4 % (ref 34–48)
HDLC SERPL-MCNC: 45 MG/DL
HEMOGLOBIN: 13.7 G/DL (ref 11.5–15.5)
IMMATURE GRANULOCYTES #: 0.02 E9/L
IMMATURE GRANULOCYTES %: 0.3 % (ref 0–5)
LDL CHOLESTEROL CALCULATED: 51 MG/DL (ref 0–99)
LYMPHOCYTES ABSOLUTE: 1.19 E9/L (ref 1.5–4)
LYMPHOCYTES RELATIVE PERCENT: 15.9 % (ref 20–42)
MCH RBC QN AUTO: 28.7 PG (ref 26–35)
MCHC RBC AUTO-ENTMCNC: 30.2 % (ref 32–34.5)
MCV RBC AUTO: 95.2 FL (ref 80–99.9)
MONOCYTES ABSOLUTE: 0.62 E9/L (ref 0.1–0.95)
MONOCYTES RELATIVE PERCENT: 8.3 % (ref 2–12)
NEUTROPHILS ABSOLUTE: 5.58 E9/L (ref 1.8–7.3)
NEUTROPHILS RELATIVE PERCENT: 74.7 % (ref 43–80)
PDW BLD-RTO: 13.2 FL (ref 11.5–15)
PHOSPHORUS: 3.5 MG/DL (ref 2.5–4.5)
PLATELET # BLD: 240 E9/L (ref 130–450)
PMV BLD AUTO: 9.9 FL (ref 7–12)
POTASSIUM SERPL-SCNC: 4.3 MMOL/L (ref 3.5–5)
RBC # BLD: 4.77 E12/L (ref 3.5–5.5)
SODIUM BLD-SCNC: 141 MMOL/L (ref 132–146)
TOTAL PROTEIN: 8.8 G/DL (ref 6.4–8.3)
TRIGL SERPL-MCNC: 80 MG/DL (ref 0–149)
VLDLC SERPL CALC-MCNC: 16 MG/DL
WBC # BLD: 7.5 E9/L (ref 4.5–11.5)

## 2020-02-13 PROCEDURE — 84460 ALANINE AMINO (ALT) (SGPT): CPT

## 2020-02-13 PROCEDURE — 82247 BILIRUBIN TOTAL: CPT

## 2020-02-13 PROCEDURE — 80061 LIPID PANEL: CPT

## 2020-02-13 PROCEDURE — 84155 ASSAY OF PROTEIN SERUM: CPT

## 2020-02-13 PROCEDURE — 82248 BILIRUBIN DIRECT: CPT

## 2020-02-13 PROCEDURE — 84075 ASSAY ALKALINE PHOSPHATASE: CPT

## 2020-02-13 PROCEDURE — 80069 RENAL FUNCTION PANEL: CPT

## 2020-02-13 PROCEDURE — 84450 TRANSFERASE (AST) (SGOT): CPT

## 2020-02-13 PROCEDURE — 85025 COMPLETE CBC W/AUTO DIFF WBC: CPT

## 2020-02-13 PROCEDURE — 36415 COLL VENOUS BLD VENIPUNCTURE: CPT

## 2020-02-13 PROCEDURE — 80162 ASSAY OF DIGOXIN TOTAL: CPT

## 2020-02-20 RX ORDER — FUROSEMIDE 20 MG/1
TABLET ORAL
Qty: 60 TABLET | Refills: 5 | Status: SHIPPED
Start: 2020-02-20 | End: 2020-09-15 | Stop reason: SDUPTHER

## 2020-02-20 RX ORDER — ALPRAZOLAM 0.25 MG/1
TABLET ORAL
Qty: 60 TABLET | OUTPATIENT
Start: 2020-02-20

## 2020-02-20 NOTE — TELEPHONE ENCOUNTER
Last Appointment:  1/30/2020  Future Appointments   Date Time Provider [place]   2/26/2020  2:45 PM Carol Isidro DO ELECTRO PHYS [facility]   2/28/2020  1:00 PM DO ELIANA Worthington [facility]   5/11/2020  8:30 AM SCHEDULE, REMOTE CHECK CURLY ELECTRO PHYS [facility]

## 2020-02-26 ENCOUNTER — OFFICE VISIT (OUTPATIENT)
Dept: NON INVASIVE DIAGNOSTICS | Age: 85
End: 2020-02-26
Payer: MEDICARE

## 2020-02-26 VITALS
WEIGHT: 100 LBS | RESPIRATION RATE: 16 BRPM | BODY MASS INDEX: 17.72 KG/M2 | HEART RATE: 68 BPM | HEIGHT: 63 IN | SYSTOLIC BLOOD PRESSURE: 122 MMHG | DIASTOLIC BLOOD PRESSURE: 54 MMHG

## 2020-02-26 PROCEDURE — G8428 CUR MEDS NOT DOCUMENT: HCPCS | Performed by: INTERNAL MEDICINE

## 2020-02-26 PROCEDURE — 4040F PNEUMOC VAC/ADMIN/RCVD: CPT | Performed by: INTERNAL MEDICINE

## 2020-02-26 PROCEDURE — 93283 PRGRMG EVAL IMPLANTABLE DFB: CPT | Performed by: INTERNAL MEDICINE

## 2020-02-26 PROCEDURE — 1090F PRES/ABSN URINE INCON ASSESS: CPT | Performed by: INTERNAL MEDICINE

## 2020-02-26 PROCEDURE — G8484 FLU IMMUNIZE NO ADMIN: HCPCS | Performed by: INTERNAL MEDICINE

## 2020-02-26 PROCEDURE — G8400 PT W/DXA NO RESULTS DOC: HCPCS | Performed by: INTERNAL MEDICINE

## 2020-02-26 PROCEDURE — G8419 CALC BMI OUT NRM PARAM NOF/U: HCPCS | Performed by: INTERNAL MEDICINE

## 2020-02-26 PROCEDURE — 99214 OFFICE O/P EST MOD 30 MIN: CPT | Performed by: INTERNAL MEDICINE

## 2020-02-26 PROCEDURE — 93290 INTERROG DEV EVAL ICPMS IP: CPT | Performed by: INTERNAL MEDICINE

## 2020-02-26 PROCEDURE — 1123F ACP DISCUSS/DSCN MKR DOCD: CPT | Performed by: INTERNAL MEDICINE

## 2020-02-26 PROCEDURE — 1036F TOBACCO NON-USER: CPT | Performed by: INTERNAL MEDICINE

## 2020-02-26 RX ORDER — SOTALOL HYDROCHLORIDE 80 MG/1
80 TABLET ORAL DAILY
Qty: 90 TABLET | Refills: 3 | Status: SHIPPED | OUTPATIENT
Start: 2020-02-26

## 2020-02-26 ASSESSMENT — ENCOUNTER SYMPTOMS: RESPIRATORY NEGATIVE: 1

## 2020-02-26 NOTE — PROGRESS NOTES
[facility] Cardiac Electrophysiology Office Progress Note    Rj Pinto  1935  Date of Service: 2/26/20   [address]  Electrophysiologist: Maria Fernanda Brown DO    SUBJECTIVE: 9/24/18 Rj Pinto presents to the office today for a 6 month follow-up and the medical management of PAF, Sotalol AAD therapy and ICD in situ. At our last visit we discussed the change in her code status to [facility]: no intubation or chest compressions and was agreeable to defibrillation and medications. We discussed her ICD/ICD therapies and if she decides no defibrillation we will turn off ICD therapies. She remains on Sotalol with a stable QTc. She is also on coumadin with no bleeding issues. She recently had a UTI but is feeling better. She is very this and the skin around her ICD is intact. Her grandson is present and they were instructed to be diligent in observing the skin surrounding the device. She currently denies angina, syncope, dyspnea on exertion, paroxysmal nocturnal dyspnea, ICD shock and palpitations. 2/26/20: Ms. Yanely Haynes presents to the office today for a 6 month follow-up and the medical management of PAF, Sotalol AAD therapy and ICD in situ. She is accompanied by her son. She offers no complaints. She remains in sinus rhythm on sotalol. In review of her most recent blood work, her CrCl is 37.5 ml/min. Based on this, her sotalol dose should be reduced to daily dosing. She denies any CP, SOB, orthopnea or PND.     Patient Active Problem List    Diagnosis Date Noted    Colitis 02/04/2013    Numbness 11/13/2012    Hand pain 11/13/2012    Cardiomyopathy, ischemic 06/14/2011     Overview Note:     EF: 35%      MI (myocardial infarction) ([facility] 06/14/2011     Overview Note:     8/2009      Dual implantable cardioverter-defibrillator in situ 06/14/2011     Overview Note:     [address] [place] [address] , Serial # [id number] implantation 9/4/09   A) Atrial Lead: 5076, Serial # [id number]   B) RV lead: 5012, Serial # [id number]  replace inactive diagnosis      Paroxysmal atrial fibrillation ([facility] 06/14/2011     Overview Note:     1. Chronic Sotalol Therapy      Anticoagulant long-term use 06/14/2011    Breast carcinoma ([facility] 06/14/2011    S/P bilateral mastectomy 06/14/2011    Thyroid cancer ([facility] 06/14/2011    S/P thyroidectomy 06/14/2011    Hypothyroidism 06/14/2011    HTN (hypertension), benign 06/14/2011    TIA (transient ischemic attack) 06/14/2011       Current Outpatient Medications   Medication Sig Dispense Refill    sotalol (BETAPACE) 80 MG tablet Take 1 tablet by mouth daily 90 tablet 3    furosemide (LASIX) 20 MG tablet TAKE 1-2 TABLETS BY MOUTH EVERY DAY 60 tablet 5    rosuvastatin (CRESTOR) 20 MG tablet Take 1 tablet by mouth every other day 45 tablet 1    lisinopril (PRINIVIL;ZESTRIL) 2.5 MG tablet Take 1 tablet by mouth 2 times daily 30 tablet 0    levothyroxine (SYNTHROID) 75 MCG tablet Take 1 tablet by mouth daily 90 tablet 1    digoxin (LANOXIN) 125 MCG tablet Take 1 tablet by mouth daily 30 tablet 2    warfarin (COUMADIN) 1 MG tablet As directed by pcp 90 tablet 2    ALPRAZolam (XANAX) 0.25 MG tablet Take 1 tablet by mouth 2 times daily for 90 days. 60 tablet 2    Handicap Placard MISC by Does not apply route DURATION 5 YEARS       No current facility-administered medications for this visit. Allergies   Allergen Reactions    Fluarix Quadrivalent  [Influenza Vac Split Quad]     Ipratropium     Pcn [Penicillins]     Pregabalin Other (See Comments)     confusion    Codeine Nausea And Vomiting     Review of Systems   Respiratory: Negative. Cardiovascular: Negative. All other systems reviewed and are negative. Vitals:    02/26/20 1510   BP: (!) 122/54   Pulse: 68   Resp: 16     Constitutional: Oriented to person, place, and time. Thin and cooperative. Head: Normocephalic and atraumatic.    Eyes: Conjunctivae are normal.   Neck: No hepatojugular reflux and no JVD present. Carotid bruit is not present. Cardiovascular: S1 normal, S2 normal and intact distal pulses. A regular rhythm present. PMI is not displaced. Pulmonary/Chest: Effort normal and breath sounds normal. No respiratory distress. Abdominal: Soft. Normal appearance and bowel sounds are normal.   No tenderness. Musculoskeletal: Normal range of motion of all extremities, no muscle weakness. Neurological: Alert and oriented to person, place, and time. Gait normal.   Skin: Skin is warm and dry. No bruising, no ecchymosis and no rash noted. Extremity: No clubbing or cyanosis. No edema. Psychiatric: Normal mood and affect. Thought content normal.   ICD site: well healed, skin thin and intact, no noted abrasion/open areas    ECG: (2/26/20):  Sinus rhythm, AP-VS, 1st degree AVD, PRWP, QTc: 440 ms    2D echo 2/7/16:  Conclusions      Summary   Mildly reduced left ventricular systolic function   Ejection fraction biplane = 45%.   Prominent trabeculation   Apical hypokinesis   Normal left atrium.   Physiologic and/or trace, Mild mitral regurgitation   The aortic valve is trileaflet.   The aortic valve appears mildly sclerotic.   No pulmonary hypertension   No tamponade      Signature      ----------------------------------------------------------------   Electronically signed by Leni Fuller MD(Interpreting   physician) on 07/11/2016 07:30 AM   ----------------------------------------------------------------     M-Mode/2D Measurements & Calculations      LV Diastolic     LV Systolic Dimension: 2.2 cm   AV Cusp Separation: 1.7   Dimension: 2.9   LV Volume Diastolic: 29.6 ml    cmAO Root Dimension: 2.9   cm               LV Volume Systolic: 88.2 ml     cm   LV FS:24.1 %     LV EDV/LV EDV Index: 32.2 LC/60   LV PW Diastolic: UM/E^6MB ESV/LV ESV Index: 17.1   0.9 cm           ml/12ml/ m^2   LV PW Systolic:  EF Calculated: 46.9 %   1.2 cm           LV Mass Index: 58 l/min*m^2     LA/Aorta: 1.34   Septum   Diastolic: 1.2                                   LA volume/Index: 44.3 ml   cm               LVOT: 1.9 cm                    /46LV/F^0   Septum Systolic:                                 RA Area: 10 cm^2   1.2 cm                                                    IVC Expiration: 1.6 cm   LV Mass: 84.31 g     Doppler Measurements & Calculations      MV Peak E-Wave:   AV Peak Velocity: 1.16 m/s    LVOT Peak Velocity: 0.83   0.75 m/s          AV Peak Gradient: 5.42 mmHg   m/s   MV Peak A-Wave:   AV Mean Velocity: 0.78 m/s    LVOT Mean Velocity: 0.49   0.94 m/s          AV Mean Gradient: 2.7 mmHg    m/s   MV E/A Ratio:     AV VTI: 22.5 cm               LVOT Peak Gradient: 2.7   0.81              AV Area (Continuity):1.85     mmHgLVOT Mean Gradient:   MV Peak Gradient: cm^2                          1.1 mmHg   2.8 mmHg                                        Estimated RVSP: 33.56 mmHg   MV Mean Gradient: LVOT VTI: 14.7 cm             Estimated RAP:3 mmHg   1.1 mmHg          IVRT: 101.5 msec   MV Mean Velocity: Estimated PADP: 10 mmHg   0.46 m/s          Pulm. Vein A Reversal         TR Velocity:2.76 m/s   MV Deceleration   Duration:138.4 msec           TR Gradient:30.56 mmHg   Time: 217.2 msec  Pulm. Vein D Velocity:0.45    PV Peak Velocity: 0.9 m/s   MV P1/2t: 55.9    m/sPulm. Vein A Reversal      PV Peak Gradient: 3.22   msec              Velocity:0.28 m/s             mmHg   MVA by PHT:3.94   Pulm. Vein S Velocity: 0.46   PV Mean Velocity: 0.56 m/s   cm^2              m/s                           PV Mean Gradient: 1.5 mmHg   MV Area   (continuity): 1.9                               MA ED Velocity: 1.35 m/s   cm^2   MV E' Septal   Velocity: 0.05   m/s   MV E' Lateral   Velocity: 0.04   m/s    Device Interrogation/Reprogramming 2/26/20   Make/Model Medtronic Evera XT.  DOI: 8/25/16  Mode MVPR  60/120 ppm  P wave: 2.6 mV  Impedance: 399 ohms   Threshold: 0.75 V @ 0.4 ms  RV R wave: 9.4 mV  Impedance: 399 ohms   Threshold: 1.0 V @ 0.4

## 2020-03-23 ENCOUNTER — TELEPHONE (OUTPATIENT)
Dept: ADMINISTRATIVE | Age: 85
End: 2020-03-23

## 2020-03-31 RX ORDER — DIGOXIN 125 MCG
125 TABLET ORAL DAILY
Qty: 30 TABLET | Refills: 2 | Status: SHIPPED
Start: 2020-03-31 | End: 2020-07-02

## 2020-05-11 ENCOUNTER — NURSE ONLY (OUTPATIENT)
Dept: NON INVASIVE DIAGNOSTICS | Age: 85
End: 2020-05-11
Payer: MEDICARE

## 2020-05-11 ENCOUNTER — TELEPHONE (OUTPATIENT)
Dept: PRIMARY CARE CLINIC | Age: 85
End: 2020-05-11

## 2020-05-11 PROCEDURE — 93296 REM INTERROG EVL PM/IDS: CPT | Performed by: INTERNAL MEDICINE

## 2020-05-11 PROCEDURE — 93295 DEV INTERROG REMOTE 1/2/MLT: CPT | Performed by: INTERNAL MEDICINE

## 2020-05-11 RX ORDER — LISINOPRIL 2.5 MG/1
2.5 TABLET ORAL 2 TIMES DAILY
Qty: 60 TABLET | Refills: 0 | Status: SHIPPED
Start: 2020-05-11 | End: 2020-06-08

## 2020-05-11 RX ORDER — ALPRAZOLAM 0.25 MG/1
0.25 TABLET ORAL 2 TIMES DAILY
Qty: 60 TABLET | Refills: 0 | Status: SHIPPED
Start: 2020-05-11 | End: 2020-09-15 | Stop reason: SDUPTHER

## 2020-06-08 RX ORDER — LISINOPRIL 2.5 MG/1
TABLET ORAL
Qty: 60 TABLET | Refills: 0 | Status: SHIPPED
Start: 2020-06-08 | End: 2020-07-07

## 2020-08-10 ENCOUNTER — NURSE ONLY (OUTPATIENT)
Dept: NON INVASIVE DIAGNOSTICS | Age: 85
End: 2020-08-10
Payer: MEDICARE

## 2020-08-10 PROCEDURE — 93296 REM INTERROG EVL PM/IDS: CPT | Performed by: INTERNAL MEDICINE

## 2020-08-10 PROCEDURE — 93297 REM INTERROG DEV EVAL ICPMS: CPT | Performed by: INTERNAL MEDICINE

## 2020-08-10 PROCEDURE — 93295 DEV INTERROG REMOTE 1/2/MLT: CPT | Performed by: INTERNAL MEDICINE

## 2020-08-11 NOTE — PROGRESS NOTES
See [employer] [place] report. Remote monitoring reviewed over a 90 day period. End of 90 day monitoring period date of service 8-      Make/Model Medtronic Evera XT.  DOI: 8/25/16  Mode MVPR  60/120 ppm  Presenting rhythm: SR, ApVs, HR ~60bpm  P wave: 3.3 mV  Impedance: 399 ohms   Threshold: 0.625 V @ 0.4 ms  RV R wave: 8.6 mV  Impedance: 399 ohms   Threshold: 0.875 V @ 0.4 ms  Pacing: A: 84.5%  RV: <1%    Battery Voltage/Longevity:  6.1 years, charge time: 3.7 seconds    Arrhythmias: 6 episodes AF/Fl  - AF burden <1%  -longest episode 11 min 25 sec 6/3/2020  -most recent 7/20/20 appears SVT atrially driven with  bpm >> 53 seconds  -3 episodes 6/3/2020: total ~ 24 minutes  OptiVol: at baseline.     Medications:  -Lanoxin 125 mcg QD  -Sotalol 80 mg QD  -Coumadin as directed  -Synthroid 75 mcg QD  -Lasix 20 mg QD     Plan:  -91 day remote transmission  -OV recall 8/2020 with Dr Clarissa Lopez, APRN-CNP, [facility]

## 2020-09-15 ENCOUNTER — OFFICE VISIT (OUTPATIENT)
Dept: FAMILY MEDICINE CLINIC | Age: 85
End: 2020-09-15
Payer: MEDICARE

## 2020-09-15 VITALS
TEMPERATURE: 97.7 F | RESPIRATION RATE: 16 BRPM | OXYGEN SATURATION: 94 % | BODY MASS INDEX: 18.42 KG/M2 | DIASTOLIC BLOOD PRESSURE: 80 MMHG | WEIGHT: 104 LBS | SYSTOLIC BLOOD PRESSURE: 140 MMHG | HEART RATE: 81 BPM

## 2020-09-15 LAB
INTERNATIONAL NORMALIZATION RATIO, POC: 3.4
PROTHROMBIN TIME, POC: 40.4

## 2020-09-15 PROCEDURE — 1036F TOBACCO NON-USER: CPT | Performed by: INTERNAL MEDICINE

## 2020-09-15 PROCEDURE — 4040F PNEUMOC VAC/ADMIN/RCVD: CPT | Performed by: INTERNAL MEDICINE

## 2020-09-15 PROCEDURE — 99214 OFFICE O/P EST MOD 30 MIN: CPT | Performed by: INTERNAL MEDICINE

## 2020-09-15 PROCEDURE — 1123F ACP DISCUSS/DSCN MKR DOCD: CPT | Performed by: INTERNAL MEDICINE

## 2020-09-15 PROCEDURE — 85610 PROTHROMBIN TIME: CPT | Performed by: INTERNAL MEDICINE

## 2020-09-15 PROCEDURE — G8428 CUR MEDS NOT DOCUMENT: HCPCS | Performed by: INTERNAL MEDICINE

## 2020-09-15 PROCEDURE — G8419 CALC BMI OUT NRM PARAM NOF/U: HCPCS | Performed by: INTERNAL MEDICINE

## 2020-09-15 PROCEDURE — G8400 PT W/DXA NO RESULTS DOC: HCPCS | Performed by: INTERNAL MEDICINE

## 2020-09-15 PROCEDURE — 1090F PRES/ABSN URINE INCON ASSESS: CPT | Performed by: INTERNAL MEDICINE

## 2020-09-15 RX ORDER — LEVOTHYROXINE SODIUM 0.07 MG/1
75 TABLET ORAL DAILY
Qty: 90 TABLET | Refills: 1 | Status: SHIPPED
Start: 2020-09-15 | End: 2020-11-12 | Stop reason: SDUPTHER

## 2020-09-15 RX ORDER — ROSUVASTATIN CALCIUM 20 MG/1
20 TABLET, COATED ORAL EVERY OTHER DAY
Qty: 45 TABLET | Refills: 0 | Status: SHIPPED
Start: 2020-09-15 | End: 2020-11-12 | Stop reason: SDUPTHER

## 2020-09-15 RX ORDER — WARFARIN SODIUM 1 MG/1
TABLET ORAL
Qty: 90 TABLET | Refills: 2 | Status: SHIPPED
Start: 2020-09-15 | End: 2020-11-12 | Stop reason: SDUPTHER

## 2020-09-15 RX ORDER — ALPRAZOLAM 0.25 MG/1
0.25 TABLET ORAL 2 TIMES DAILY
Qty: 60 TABLET | Refills: 0 | Status: SHIPPED | OUTPATIENT
Start: 2020-09-15 | End: 2020-10-15 | Stop reason: SDUPTHER

## 2020-09-15 RX ORDER — DIGOXIN 125 MCG
125 TABLET ORAL DAILY
Qty: 30 TABLET | Refills: 2 | Status: SHIPPED
Start: 2020-09-15 | End: 2020-11-12 | Stop reason: SDUPTHER

## 2020-09-15 RX ORDER — FUROSEMIDE 20 MG/1
TABLET ORAL
Qty: 60 TABLET | Refills: 5 | Status: SHIPPED | OUTPATIENT
Start: 2020-09-15

## 2020-09-15 ASSESSMENT — PATIENT HEALTH QUESTIONNAIRE - PHQ9
SUM OF ALL RESPONSES TO PHQ QUESTIONS 1-9: 0
2. FEELING DOWN, DEPRESSED OR HOPELESS: 0
1. LITTLE INTEREST OR PLEASURE IN DOING THINGS: 0
SUM OF ALL RESPONSES TO PHQ9 QUESTIONS 1 & 2: 0
SUM OF ALL RESPONSES TO PHQ QUESTIONS 1-9: 0

## 2020-09-15 NOTE — PROGRESS NOTES
9/15/2020     Nolvia Mantilla (:  1935) is a 80 y.o. female, who presents today past due for exam.  She is not had an INR checked since January but yet the Coumadin is still being taken at this and her bag of medications on medication reconciliation. OARRS evaluated today was appropriate for the patient's Xanax. Since is been 9 months since last seen she has been rationing her Xanax. Chief Complaint   Patient presents with    Hypertension    Hypothyroidism    Hyperlipidemia    Office Visit for Anticoagulation Management         Review of Systems    Prior to Visit Medications    Medication Sig Taking? Authorizing Provider   lisinopril (PRINIVIL;ZESTRIL) 2.5 MG tablet TAKE 1 TABLET BY MOUTH TWICE DAILY Yes Clarkston Blood, DO   levothyroxine (SYNTHROID) 75 MCG tablet TAKE 1 TABLET BY MOUTH DAILY Yes [place]ston Blood, DO   rosuvastatin (CRESTOR) 20 MG tablet TAKE 1 TABLET BY MOUTH EVERY OTHER DAY Yes Clarkston Blood, DO   digoxin (LANOXIN) 125 MCG tablet TAKE 1 TABLET BY MOUTH DAILY Yes Clarkston Blood, DO   sotalol (BETAPACE) 80 MG tablet Take 1 tablet by mouth daily Yes Shena Heatho, DO   furosemide (LASIX) 20 MG tablet TAKE 1-2 TABLETS BY MOUTH EVERY DAY Yes Clarkston Blood, DO   warfarin (COUMADIN) 1 MG tablet As directed by pcp Yes Clarkston Blood, DO   ALPRAZolam Mimi Jankiy) 0.25 MG tablet Take 1 tablet by mouth 2 times daily for 90 days.   Patient not taking: Reported on 9/15/2020  Clarkston Blood, DO   Handicap Placard MISC by Does not apply route DURATION 5 YEARS  Historical Provider, MD      Allergies   Allergen Reactions    Fluarix Quadrivalent  [Influenza Vac Split Quad]     Ipratropium     Pcn [Penicillins]     Pregabalin Other (See Comments)     confusion    Codeine Nausea And Vomiting       Past Medical History:   Diagnosis Date    Arm pain     lower arms    Atrial fibrillation (HCC)     CAD (coronary artery disease)     Cancer ([facility] bilateral breasts and thyroid    Cardiac defibrillator in place September 4, 2009    Dr. Ervin Chandra [phone]    Cataracts, bilateral     CHF (congestive heart failure) ([address]     CHF (congestive heart failure) (HCC)     Hand pain     bilateral    Hypothyroidism     Ischemic cardiomyopathy     Myocardial infarction ([address]     SEPTAL AND INFERIOR WALL, DIMINISHED EF 35%    Numbness and tingling     arms and hands    Stroke ([address]     TIA (transient ischemic attack)      Past Surgical History:   Procedure Laterality Date    APPENDECTOMY      BREAST SURGERY      CARDIAC DEFIBRILLATOR PLACEMENT  08/25/2016    Medtronic Dual chamber ICD gen change    CHOLECYSTECTOMY      EYE SURGERY  2007    bilateral cataracts    GALLBLADDER SURGERY      PACEMAKER INSERTION  09/04/2009    [facility] Er[facility])    THYROID SURGERY      TONSILLECTOMY        Social History     Tobacco Use    Smoking status: Never Smoker    Smokeless tobacco: Never Used   Substance Use Topics    Alcohol use: No        Vitals:    09/15/20 1359   BP: (!) 140/80   Pulse: 81   Resp: 16   Temp: 97.7 °F (36.5 °C)   SpO2: 94%   Weight: 104 lb (47.2 kg)     Estimated body mass index is 18.42 kg/m² as calculated from the following:    Height as of 2/26/20: 5' 3\" (1.6 m). Weight as of this encounter: 104 lb (47.2 kg). Physical Exam  Constitutional:       Appearance: Normal appearance. HENT:      Head: Normocephalic and atraumatic. Right Ear: Tympanic membrane, ear canal and external ear normal.      Left Ear: Tympanic membrane, ear canal and external ear normal.      Nose: Nose normal.      Mouth/Throat:      Mouth: Mucous membranes are moist.      Pharynx: Oropharynx is clear. Eyes:      Extraocular Movements: Extraocular movements intact. Conjunctiva/sclera: Conjunctivae normal.      Pupils: Pupils are equal, round, and reactive to light. Neck:      Musculoskeletal: Neck supple.    Cardiovascular: Rate and Rhythm: Normal rate and regular rhythm. Pulmonary:      Breath sounds: No wheezing, rhonchi or rales. Musculoskeletal:      Right lower leg: No edema. Left lower leg: No edema. Lymphadenopathy:      Cervical: No cervical adenopathy. Skin:     General: Skin is warm and dry. Capillary Refill: Capillary refill takes less than 2 seconds. Neurological:      General: No focal deficit present. Mental Status: She is alert and oriented to person, place, and time. Psychiatric:         Mood and Affect: Mood normal.     INR: 3.4  ASSESSMENT/PLAN:  Melisa Donald was seen today for hypertension, hypothyroidism, hyperlipidemia and office visit for anticoagulation management. Diagnoses and all orders for this visit:    HTN (hypertension), benign    Paroxysmal atrial fibrillation (HCC)  -     POCT INR    Mixed hyperlipidemia    Anxiety    Anticoagulant long-term use       Orders amazing that her INR was only 3.4 and still controlled after 9 months of not monitoring it. We will see her back in 1 month for recheck of her INR. And given her typed instructions on her dosing of Coumadin. Refill of her medication given today including the Xanax. An electronic signature was used to authenticate this note.     --Jett Castano DO on 9/15/2020 at 2:11 PM

## 2020-10-08 RX ORDER — LISINOPRIL 2.5 MG/1
TABLET ORAL
Qty: 60 TABLET | Refills: 2 | Status: SHIPPED | OUTPATIENT
Start: 2020-10-08

## 2020-10-15 ENCOUNTER — OFFICE VISIT (OUTPATIENT)
Dept: FAMILY MEDICINE CLINIC | Age: 85
End: 2020-10-15
Payer: MEDICARE

## 2020-10-15 VITALS
HEART RATE: 80 BPM | WEIGHT: 103 LBS | DIASTOLIC BLOOD PRESSURE: 70 MMHG | OXYGEN SATURATION: 93 % | RESPIRATION RATE: 16 BRPM | SYSTOLIC BLOOD PRESSURE: 120 MMHG | TEMPERATURE: 97.7 F | BODY MASS INDEX: 18.25 KG/M2

## 2020-10-15 LAB
INTERNATIONAL NORMALIZATION RATIO, POC: 2.4
PROTHROMBIN TIME, POC: 28.7

## 2020-10-15 PROCEDURE — 85610 PROTHROMBIN TIME: CPT | Performed by: INTERNAL MEDICINE

## 2020-10-15 PROCEDURE — 1123F ACP DISCUSS/DSCN MKR DOCD: CPT | Performed by: INTERNAL MEDICINE

## 2020-10-15 PROCEDURE — 1036F TOBACCO NON-USER: CPT | Performed by: INTERNAL MEDICINE

## 2020-10-15 PROCEDURE — 1090F PRES/ABSN URINE INCON ASSESS: CPT | Performed by: INTERNAL MEDICINE

## 2020-10-15 PROCEDURE — G8419 CALC BMI OUT NRM PARAM NOF/U: HCPCS | Performed by: INTERNAL MEDICINE

## 2020-10-15 PROCEDURE — G8400 PT W/DXA NO RESULTS DOC: HCPCS | Performed by: INTERNAL MEDICINE

## 2020-10-15 PROCEDURE — 4040F PNEUMOC VAC/ADMIN/RCVD: CPT | Performed by: INTERNAL MEDICINE

## 2020-10-15 PROCEDURE — 99213 OFFICE O/P EST LOW 20 MIN: CPT | Performed by: INTERNAL MEDICINE

## 2020-10-15 PROCEDURE — G8427 DOCREV CUR MEDS BY ELIG CLIN: HCPCS | Performed by: INTERNAL MEDICINE

## 2020-10-15 PROCEDURE — G8484 FLU IMMUNIZE NO ADMIN: HCPCS | Performed by: INTERNAL MEDICINE

## 2020-10-15 RX ORDER — ALPRAZOLAM 0.25 MG/1
0.25 TABLET ORAL 2 TIMES DAILY
Qty: 60 TABLET | Refills: 2 | Status: SHIPPED | OUTPATIENT
Start: 2020-10-15 | End: 2021-04-14

## 2020-10-15 NOTE — PROGRESS NOTES
10/15/2020     Moshe Beck (:  1935) is a 80 y.o. female, here for recheck of her INR and refill of medications. Her last full laboratory performed was 2020  Chief Complaint   Patient presents with   [facility] Visit for Anticoagulation Management         Review of Systems    Prior to Visit Medications    Medication Sig Taking? Authorizing Provider   ALPRAZolam (XANAX) 0.25 MG tablet Take 1 tablet by mouth 2 times daily for 90 days.  Yes John Orantes DO   lisinopril (PRINIVIL;ZESTRIL) 2.5 MG tablet TAKE 1 TABLET BY MOUTH TWICE DAILY Yes John Orantes DO   digoxin (LANOXIN) 125 MCG tablet Take 1 tablet by mouth daily Yes John Orantes DO   furosemide (LASIX) 20 MG tablet TAKE 1-2 TABLETS BY MOUTH EVERY DAY Yes John Orantes DO   levothyroxine (SYNTHROID) 75 MCG tablet Take 1 tablet by mouth daily Yes John Orantes DO   rosuvastatin (CRESTOR) 20 MG tablet Take 1 tablet by mouth every other day Yes John Orantes DO   warfarin (COUMADIN) 1 MG tablet As directed by pcp Yes John Orantes DO   sotalol (BETAPACE) 80 MG tablet Take 1 tablet by mouth daily Yes Kali Marquez, DO   Handicap Placard MISC by Does not apply route DURATION 5 YEARS  Historical Provider, MD      Allergies   Allergen Reactions    Fluarix Quadrivalent  [Influenza Vac Split Quad]     Ipratropium     Pcn [Penicillins]     Pregabalin Other (See Comments)     confusion    Codeine Nausea And Vomiting       Past Medical History:   Diagnosis Date    Arm pain     lower arms    Atrial fibrillation ([address]     CAD (coronary artery disease)     Cancer ([address]     bilateral breasts and thyroid    Cardiac defibrillator in place 2009    Dr. Lexii Westbrook [phone]    Cataracts, bilateral     CHF (congestive heart failure) ([address]     [facility]congest[facility]) [facility]     Hand pain     bilateral    Hypothyroidism     Ischemic cardiomyopathy     Myocardial infarction ([address]     SEPTAL AND INFERIOR WALL, DIMINISHED EF 35%    Numbness and tingling     arms and hands    Stroke (HCC)     TIA (transient ischemic attack)      Past Surgical History:   Procedure Laterality Date    APPENDECTOMY      BREAST SURGERY      CARDIAC DEFIBRILLATOR PLACEMENT  08/25/2016    Medtronic Dual chamber ICD gen change    CHOLECYSTECTOMY      EYE SURGERY  2007    bilateral cataracts    GALLBLADDER SURGERY      PACEMAKER INSERTION  09/04/2009    Dr. Nataly Khan [facility])    THYROID SURGERY      TONSILLECTOMY        Social History     Tobacco Use    Smoking status: Never Smoker    Smokeless tobacco: Never Used   Substance Use Topics    Alcohol use: No        Vitals:    10/15/20 1355   BP: 120/70   Pulse: 80   Resp: 16   Temp: 97.7 °F (36.5 °C)   SpO2: 93%   Weight: 103 lb (46.7 kg)     Estimated body mass index is 18.25 kg/m² as calculated from the following:    Height as of 2/26/20: 5' 3\" (1.6 m). Weight as of this encounter: 103 lb (46.7 kg). Physical Exam  Constitutional:       Appearance: Normal appearance. HENT:      Head: Normocephalic and atraumatic. Right Ear: Tympanic membrane, ear canal and external ear normal.      Left Ear: Tympanic membrane, ear canal and external ear normal.      Nose:      Comments: Nasal mucosa shows pallor without any significant edema     Mouth/Throat:      Mouth: Mucous membranes are moist.      Pharynx: Oropharynx is clear. Eyes:      Conjunctiva/sclera: Conjunctivae normal.      Pupils: Pupils are equal, round, and reactive to light. Cardiovascular:      Rate and Rhythm: Normal rate. Rhythm irregular. Pulmonary:      Effort: Pulmonary effort is normal.      Breath sounds: No wheezing, rhonchi or rales. Neurological:      General: No focal deficit present. Mental Status: She is alert and oriented to person, place, and time.          INR is 2.4  ASSESSMENT/PLAN:  [facility] was seen today for office visit for anticoagulation management. Diagnoses and all orders for this visit:    Anticoagulant long-term use    Paroxysmal atrial fibrillation (HCC)  -     POCT INR    Anxiety  -     ALPRAZolam (XANAX) 0.25 MG tablet; Take 1 tablet by mouth 2 times daily for 90 days. Refuses flu shot due to allergic reaction   Refill the controlled substance Xanax given to her today. We will have full laboratory performed within the next month fasting she does not want to do it today. An electronic signature was used to authenticate this note.     --Kate Doty, DO on 10/15/2020 at 2:05 PM

## 2020-10-29 LAB
A/G RATIO: NORMAL
ALBUMIN SERPL-MCNC: NORMAL G/DL
ALBUMIN SERPL-MCNC: NORMAL G/DL
ALP BLD-CCNC: NORMAL U/L
ALT SERPL-CCNC: NORMAL U/L
AST SERPL-CCNC: NORMAL U/L
BASOPHILS ABSOLUTE: NORMAL
BASOPHILS RELATIVE PERCENT: NORMAL
BILIRUB SERPL-MCNC: NORMAL MG/DL
BILIRUBIN DIRECT: NORMAL
BILIRUBIN, INDIRECT: NORMAL
BUN / CREAT RATIO: NORMAL
BUN BLDV-MCNC: NORMAL MG/DL
CALCIUM SERPL-MCNC: NORMAL MG/DL
CHLORIDE BLD-SCNC: NORMAL MMOL/L
CHOLESTEROL, TOTAL: NORMAL
CHOLESTEROL/HDL RATIO: NORMAL
CO2: NORMAL
CREAT SERPL-MCNC: NORMAL MG/DL
EOSINOPHILS ABSOLUTE: NORMAL
EOSINOPHILS RELATIVE PERCENT: NORMAL
GLOBULIN: NORMAL
GLUCOSE: NORMAL
HCT VFR BLD CALC: NORMAL %
HDLC SERPL-MCNC: NORMAL MG/DL
HEMOGLOBIN: NORMAL
LDL CHOLESTEROL CALCULATED: NORMAL
LYMPHOCYTES ABSOLUTE: NORMAL
LYMPHOCYTES RELATIVE PERCENT: NORMAL
MCH RBC QN AUTO: NORMAL PG
MCHC RBC AUTO-ENTMCNC: NORMAL G/DL
MCV RBC AUTO: NORMAL FL
MONOCYTES ABSOLUTE: NORMAL
MONOCYTES RELATIVE PERCENT: NORMAL
NEUTROPHILS ABSOLUTE: NORMAL
NEUTROPHILS RELATIVE PERCENT: NORMAL
NONHDLC SERPL-MCNC: NORMAL MG/DL
PDW BLD-RTO: NORMAL %
PHOSPHORUS: NORMAL
PLATELET # BLD: NORMAL 10*3/UL
PMV BLD AUTO: NORMAL FL
POTASSIUM SERPL-SCNC: NORMAL MMOL/L
PROTEIN TOTAL: NORMAL
RBC # BLD: NORMAL 10*6/UL
SODIUM BLD-SCNC: NORMAL MMOL/L
TRIGL SERPL-MCNC: NORMAL MG/DL
TSH SERPL DL<=0.05 MIU/L-ACNC: NORMAL M[IU]/L
VLDLC SERPL CALC-MCNC: NORMAL MG/DL
WBC # BLD: NORMAL 10*3/UL

## 2020-11-09 ENCOUNTER — NURSE ONLY (OUTPATIENT)
Dept: NON INVASIVE DIAGNOSTICS | Age: 85
End: 2020-11-09
Payer: MEDICARE

## 2020-11-09 PROCEDURE — 93296 REM INTERROG EVL PM/IDS: CPT | Performed by: INTERNAL MEDICINE

## 2020-11-09 PROCEDURE — 93297 REM INTERROG DEV EVAL ICPMS: CPT | Performed by: INTERNAL MEDICINE

## 2020-11-09 PROCEDURE — 93295 DEV INTERROG REMOTE 1/2/MLT: CPT | Performed by: INTERNAL MEDICINE

## 2020-11-12 ENCOUNTER — OFFICE VISIT (OUTPATIENT)
Dept: FAMILY MEDICINE CLINIC | Age: 85
End: 2020-11-12
Payer: MEDICARE

## 2020-11-12 VITALS
WEIGHT: 100 LBS | BODY MASS INDEX: 17.71 KG/M2 | TEMPERATURE: 97.3 F | HEART RATE: 73 BPM | DIASTOLIC BLOOD PRESSURE: 60 MMHG | OXYGEN SATURATION: 93 % | SYSTOLIC BLOOD PRESSURE: 110 MMHG | RESPIRATION RATE: 16 BRPM

## 2020-11-12 LAB
INTERNATIONAL NORMALIZATION RATIO, POC: 3.1
PROTHROMBIN TIME, POC: 37.4

## 2020-11-12 PROCEDURE — G8400 PT W/DXA NO RESULTS DOC: HCPCS | Performed by: INTERNAL MEDICINE

## 2020-11-12 PROCEDURE — 85610 PROTHROMBIN TIME: CPT | Performed by: INTERNAL MEDICINE

## 2020-11-12 PROCEDURE — G8427 DOCREV CUR MEDS BY ELIG CLIN: HCPCS | Performed by: INTERNAL MEDICINE

## 2020-11-12 PROCEDURE — 1036F TOBACCO NON-USER: CPT | Performed by: INTERNAL MEDICINE

## 2020-11-12 PROCEDURE — 4040F PNEUMOC VAC/ADMIN/RCVD: CPT | Performed by: INTERNAL MEDICINE

## 2020-11-12 PROCEDURE — 1090F PRES/ABSN URINE INCON ASSESS: CPT | Performed by: INTERNAL MEDICINE

## 2020-11-12 PROCEDURE — G8484 FLU IMMUNIZE NO ADMIN: HCPCS | Performed by: INTERNAL MEDICINE

## 2020-11-12 PROCEDURE — 99213 OFFICE O/P EST LOW 20 MIN: CPT | Performed by: INTERNAL MEDICINE

## 2020-11-12 PROCEDURE — 1123F ACP DISCUSS/DSCN MKR DOCD: CPT | Performed by: INTERNAL MEDICINE

## 2020-11-12 PROCEDURE — G8419 CALC BMI OUT NRM PARAM NOF/U: HCPCS | Performed by: INTERNAL MEDICINE

## 2020-11-12 RX ORDER — LEVOTHYROXINE SODIUM 0.07 MG/1
75 TABLET ORAL DAILY
Qty: 90 TABLET | Refills: 1 | Status: SHIPPED | OUTPATIENT
Start: 2020-11-12

## 2020-11-12 RX ORDER — WARFARIN SODIUM 1 MG/1
TABLET ORAL
Qty: 90 TABLET | Refills: 2 | Status: SHIPPED | OUTPATIENT
Start: 2020-11-12

## 2020-11-12 RX ORDER — DIGOXIN 125 MCG
125 TABLET ORAL DAILY
Qty: 30 TABLET | Refills: 2 | Status: SHIPPED | OUTPATIENT
Start: 2020-11-12

## 2020-11-12 RX ORDER — ROSUVASTATIN CALCIUM 20 MG/1
20 TABLET, COATED ORAL EVERY OTHER DAY
Qty: 45 TABLET | Refills: 0 | Status: SHIPPED | OUTPATIENT
Start: 2020-11-12

## 2020-11-12 NOTE — PROGRESS NOTES
2020     Ja Vidal (:  1935) is a 80 y.o. female, resents today for refill of medications as well as her INR. Does not have any new complaints otherwise. Review of Systems    Prior to Visit Medications    Medication Sig Taking? Authorizing Provider   digoxin (LANOXIN) 125 MCG tablet Take 1 tablet by mouth daily Yes Joelle Tobin, DO   warfarin (COUMADIN) 1 MG tablet As directed by pcp Yes Joelle Tobin, DO   rosuvastatin (CRESTOR) 20 MG tablet Take 1 tablet by mouth every other day Yes Joelle Tobin, DO   levothyroxine (SYNTHROID) 75 MCG tablet Take 1 tablet by mouth daily Yes Joelle Tobin, DO   ALPRAZolam Moise Ket) 0.25 MG tablet Take 1 tablet by mouth 2 times daily for 90 days.  Yes Joelle Tobin, DO   lisinopril (PRINIVIL;ZESTRIL) 2.5 MG tablet TAKE 1 TABLET BY MOUTH TWICE DAILY Yes Joelle Tobin, DO   furosemide (LASIX) 20 MG tablet TAKE 1-2 TABLETS BY MOUTH EVERY DAY Yes Joelle Tobin, DO   sotalol (BETAPACE) 80 MG tablet Take 1 tablet by mouth daily  Patient taking differently: Take 80 mg by mouth 2 times daily  Yes Kaylyn Alpha, DO   Handicap Placard MISC by Does not apply route DURATION 5 YEARS  Historical Provider, MD      Allergies   Allergen Reactions    Fluarix Quadrivalent  [Influenza Vac Split Quad]     Ipratropium     Pcn [Penicillins]     Pregabalin Other (See Comments)     confusion    Codeine Nausea And Vomiting       Past Medical History:   Diagnosis Date    Arm pain     lower arms    Atrial fibrillation (HCC)     CAD (coronary artery disease)     Cancer (HCC)     bilateral breasts and thyroid    Cardiac defibrillator in place 2009    Dr. Lizbeth Stephen [phone]    Cataracts, bilateral     CHF (congestive heart failure) (HCC)     CHF (congestive heart failure) (HCC)     Hand pain     bilateral    Hypothyroidism     Ischemic cardiomyopathy     Myocardial infarction ([facility]     SEPTAL AND INFERIOR WALL, DIMINISHED EF 35%    Numbness and tingling     arms and hands    Stroke (HCC)     TIA (transient ischemic attack)      Past Surgical History:   Procedure Laterality Date    APPENDECTOMY      BREAST SURGERY      CARDIAC DEFIBRILLATOR PLACEMENT  08/25/2016    Medtronic Dual chamber ICD gen change    CHOLECYSTECTOMY      EYE SURGERY  2007    bilateral cataracts    GALLBLADDER SURGERY      PACEMAKER INSERTION  09/04/2009    Dr. Brandie Genao [facility])    THYROID SURGERY      TONSILLECTOMY        Social History     Tobacco Use    Smoking status: Never Smoker    Smokeless tobacco: Never Used   Substance Use Topics    Alcohol use: No        Vitals:    11/12/20 1308   BP: 110/60   Pulse: 73   Resp: 16   Temp: 97.3 °F (36.3 °C)   SpO2: 93%   Weight: 100 lb (45.4 kg)     Estimated body mass index is 17.71 kg/m² as calculated from the following:    Height as of 2/26/20: 5' 3\" (1.6 m). Weight as of this encounter: 100 lb (45.4 kg). Physical Exam  Constitutional:       Appearance: Normal appearance. HENT:      Head: Normocephalic and atraumatic. Right Ear: Tympanic membrane, ear canal and external ear normal.      Left Ear: Tympanic membrane, ear canal and external ear normal.   Cardiovascular:      Rate and Rhythm: Normal rate. Rhythm irregular. Pulmonary:      Effort: Pulmonary effort is normal.      Breath sounds: No wheezing, rhonchi or rales. Musculoskeletal:      Right lower leg: No edema. Left lower leg: No edema. Neurological:      Mental Status: She is alert. INR is 3.1 today    ASSESSMENT/PLAN:  Diagnoses and all orders for this visit:    Anticoagulant long-term use  -     warfarin (COUMADIN) 1 MG tablet; As directed by pcp    Paroxysmal atrial fibrillation (HCC)  -     POCT INR    Other orders  -     digoxin (LANOXIN) 125 MCG tablet; Take 1 tablet by mouth daily  -     rosuvastatin (CRESTOR) 20 MG tablet;  Take 1 tablet by mouth every other day  -

## 2020-11-20 NOTE — PROGRESS NOTES
See [employer] [place] report. Remote monitoring reviewed over a 90 day period. End of 90 day monitoring period date of service 11.9.2020.

## 2020-12-23 ENCOUNTER — TELEPHONE (OUTPATIENT)
Dept: NON INVASIVE DIAGNOSTICS | Age: 85
End: 2020-12-23

## 2020-12-23 NOTE — TELEPHONE ENCOUNTER
----- Message from Colin Alfaro sent at 12/18/2020  8:02 AM EST -----  Regarding: APPT NEEDS R/S W/ LE IN JAN  APPT NEEDS R/S W/ LE IN JAN    [phone]  [facility]

## 2021-01-01 ENCOUNTER — NURSE ONLY (OUTPATIENT)
Dept: NON INVASIVE DIAGNOSTICS | Age: 86
End: 2021-01-01
Payer: MEDICARE

## 2021-01-01 DIAGNOSIS — Z95.810 DUAL IMPLANTABLE CARDIOVERTER-DEFIBRILLATOR IN SITU: ICD-10-CM

## 2021-01-01 DIAGNOSIS — I25.5 CARDIOMYOPATHY, ISCHEMIC: Primary | ICD-10-CM

## 2021-01-01 PROCEDURE — 93296 REM INTERROG EVL PM/IDS: CPT | Performed by: INTERNAL MEDICINE

## 2021-01-01 PROCEDURE — 93295 DEV INTERROG REMOTE 1/2/MLT: CPT | Performed by: INTERNAL MEDICINE

## 2021-02-08 ENCOUNTER — NURSE ONLY (OUTPATIENT)
Dept: NON INVASIVE DIAGNOSTICS | Age: 86
End: 2021-02-08
Payer: MEDICARE

## 2021-02-08 DIAGNOSIS — I25.5 CARDIOMYOPATHY, ISCHEMIC: ICD-10-CM

## 2021-02-08 DIAGNOSIS — Z95.810 DUAL IMPLANTABLE CARDIOVERTER-DEFIBRILLATOR IN SITU: Primary | ICD-10-CM

## 2021-02-08 PROCEDURE — 93296 REM INTERROG EVL PM/IDS: CPT | Performed by: INTERNAL MEDICINE

## 2021-02-08 PROCEDURE — 93295 DEV INTERROG REMOTE 1/2/MLT: CPT | Performed by: INTERNAL MEDICINE

## 2021-02-08 PROCEDURE — 93297 REM INTERROG DEV EVAL ICPMS: CPT | Performed by: INTERNAL MEDICINE

## 2021-03-01 NOTE — PROGRESS NOTES
See [employer] [place] report. Remote monitoring reviewed over a 90 day period. End of 90 day monitoring period date of service 2.8.2021.

## 2021-03-02 ENCOUNTER — TELEPHONE (OUTPATIENT)
Dept: NON INVASIVE DIAGNOSTICS | Age: 86
End: 2021-03-02

## 2021-03-02 NOTE — TELEPHONE ENCOUNTER
Called the patient she needs an afternoon appointment will call her back if there is a CXLD in the afternoon or when April schedule is available

## 2021-03-02 NOTE — TELEPHONE ENCOUNTER
Pt returning call to schedule, however, Ceferino Rivas CNP has no openings in March - April not available. Please call pt when April schedule is open.

## 2021-04-06 NOTE — PROGRESS NOTES
[id number] [facility] Cardiac Electrophysiology Office Progress Note    Bea Rivera  1935  Date of Service: 4/14/21    Primary Care: Haleigh Johnston DO  [address] [facility] (Dr Radha Delacruz)  Electrophysiologist: Zachery Watts DO    SUBJECTIVE: 9/24/18 Bea Rivera presents to the office today for a 6 month follow-up and the medical management of PAF, Sotalol AAD therapy and ICD in situ. At our last visit we discussed the change in her code status to [facility]: no intubation or chest compressions and was agreeable to defibrillation and medications. We discussed her ICD/ICD therapies and if she decides no defibrillation we will turn off ICD therapies. She remains on Sotalol with a stable QTc. She is also on coumadin with no bleeding issues. She recently had a UTI but is feeling better. She is very this and the skin around her ICD is intact. Her grandson is present and they were instructed to be diligent in observing the skin surrounding the device. She currently denies angina, syncope, dyspnea on exertion, paroxysmal nocturnal dyspnea, ICD shock and palpitations. 2/26/20: Ms. Harmony Cortes presents to the office today for a 6 month follow-up and the medical management of PAF, Sotalol AAD therapy and ICD in situ. She is accompanied by her son. She offers no complaints. She remains in sinus rhythm on sotalol. In review of her most recent blood work, her CrCl is 37.5 ml/min. Based on this, her sotalol dose should be reduced to daily dosing. She denies any CP, SOB, orthopnea or PND.    04/14/21 Bea Rivear presents for EP follow-up on Sotalol, Lanoxin, and ICD check accompanied by her son-in-law. At the above OV with Dr Aniyah Vivar, based on her CrCl, the recommendation was made to decrease Sotalol dosing to 80 mg QD from BID. Ms Harmony Cortes states she did that but felt \"washed-out\" so resumed back to BID Sotalol dosing. She states she discussed this with cardiology, however, I do not have any documentation at this time. We discussed, at length, Sotalol dosing and monitoring (QT interval) including potential pro-arrhythmic events. At this time, they have elected to continue the BID dosing and repeat a battery of lab work she is scheduled to complete. Her EKG today demonstrates SR, AP-VS, QTc 413 ms. Her device interrogation shows episodes of SVT, a few falling into the VT rate zone, with the majority occurring in in 2020 as noted on previous remote transmissions. The most recent episode was 4/8/2021; she was asymptomatic. We discussed her noted code status: DNR-CCA no intubation/CPR; she was uncertain about defibrillation. We discussed the role of the ICD. They will leave things the way they are now and discuss ICD therapies/code status with family. She does mention occasional heartburn and was asked to follow-up with Dr Evelyne Vila. Patient Active Problem List    Diagnosis Date Noted    Colitis 02/04/2013    Numbness 11/13/2012    Hand pain 11/13/2012    Cardiomyopathy, ischemic 06/14/2011     Overview Note:     EF: 35%      [facility]14/2011     Overview Note:     8/2009      Dual implantable cardioverter-defibrillator in situ 06/14/2011     Overview Note:     [address] , Serial # [id number] implantation 9/4/09   A) Atrial Lead: 5076, Serial # [id number]   B) RV lead: 6556, Serial # [id number]  replace inactive diagnosis      Paroxys[facility] fibrillation [facility] 06/14/2011     Overview Note:     1.  Chronic Sotalol Therapy      Anticoagulant long-term use 06/14/2011    Breast carcinoma [facility] 06/14/2011    S/P bilateral mastectomy 06/14/2011    Thyroid cancer ([facility] 06/14/2011    S/P thyroidectomy 06/14/2011    Hypothyroidism 06/14/2011    HTN (hypertension), benign 06/14/2011    TIA (transient ischemic attack) 06/14/2011       Current Outpatient Medications   Medication Sig Dispense Refill    digoxin (LANOXIN) 125 MCG tablet Take 1 tablet by mouth daily 30 tablet 2    clubbing or cyanosis. No edema. Psychiatric: Normal mood and affect. Thought content normal.  ICD site: stable, well healed, no evidence of erosion. Instructed to closely monitor the site. ECG: (2/26/2020): Sinus rhythm, AP-VS, 1st degree AVD, PRWP, QTc: 440 ms    EKG 04/14/2021: SR, AP-VS, first degree AV delay, negative t-waves consider anterior ischemia, QTc 413 ms.      2D echo 2/7/16:  Conclusions      Summary   Mildly reduced left ventricular systolic function   Ejection fraction biplane = 45%.   Prominent trabeculation   Apical hypokinesis   Normal left atrium.   Physiologic and/or trace, Mild mitral regurgitation   The aortic valve is trileaflet.   The aortic valve appears mildly sclerotic.   No pulmonary hypertension   No tamponade      Signature      ----------------------------------------------------------------   Electronically signed by Shelley Severin MD(Interpreting   physician) on 07/11/2016 07:30 AM   ----------------------------------------------------------------     M-Mode/2D Measurements & Calculations      LV Diastolic     LV Systolic Dimension: 2.2 cm   AV Cusp Separation: 1.7   Dimension: 2.9   LV Volume Diastolic: 38.0 ml    cmAO Root Dimension: 2.9   cm               LV Volume Systolic: 20.5 ml     cm   LV FS:24.1 %     LV EDV/LV EDV Index: 32.2 PS/46   LV PW Diastolic: BJ/B^1CQ ESV/LV ESV Index: 17.1   0.9 cm           ml/12ml/ m^2   LV PW Systolic:  EF Calculated: 46.9 %   1.2 cm           LV Mass Index: 58 l/min*m^2     LA/Aorta: 1.34   Septum   Diastolic: 1.2                                   LA volume/Index: 44.3 ml   cm               LVOT: 1.9 cm                    /39DP/N^4   Septum Systolic:                                 RA Area: 10 cm^2   1.2 cm                                                    IVC Expiration: 1.6 cm   LV Mass: 84.31 g     Doppler Measurements & Calculations      MV Peak E-Wave:   AV Peak Velocity: 1.16 m/s    LVOT Peak Velocity: 0.83   0.75 m/s          AV Peak Gradient: 5.42 mmHg   m/s   MV Peak A-Wave:   AV Mean Velocity: 0.78 m/s    LVOT Mean Velocity: 0.49   0.94 m/s          AV Mean Gradient: 2.7 mmHg    m/s   MV E/A Ratio:     AV VTI: 22.5 cm               LVOT Peak Gradient: 2.7   0.81              AV Area (Continuity):1.85     mmHgLVOT Mean Gradient:   MV Peak Gradient: cm^2                          1.1 mmHg   2.8 mmHg                                        Estimated RVSP: 33.56 mmHg   MV Mean Gradient: LVOT VTI: 14.7 cm             Estimated RAP:3 mmHg   1.1 mmHg          IVRT: 101.5 msec   MV Mean Velocity: Estimated PADP: 10 mmHg   0.46 m/s          Pulm. Vein A Reversal         TR Velocity:2.76 m/s   MV Deceleration   Duration:138.4 msec           TR Gradient:30.56 mmHg   Time: 217.2 msec  Pulm. Vein D Velocity:0.45    PV Peak Velocity: 0.9 m/s   MV P1/2t: 55.9    m/sPulm. Vein A Reversal      PV Peak Gradient: 3.22   msec              Velocity:0.28 m/s             mmHg   MVA by PHT:3.94   Pulm. Vein S Velocity: 0.46   PV Mean Velocity: 0.56 m/s   cm^2              m/s                           PV Mean Gradient: 1.5 mmHg   MV Area   (continuity): 1.9                               OR ED Velocity: 1.35 m/s   cm^2   MV E' Septal   Velocity: 0.05   m/s   MV E' Lateral   Velocity: 0.04   m/s    Device Interrogation/Reprogramming 4/14/21   Make/Model Medtronic Evera XT. DOI: 8/25/16  Mode MVPR  60/120 ppm  P wave: 2.1 mV  Impedance: 399 ohms   Threshold: 0.5 V @ 0.4 ms  RV R wave: 9.3 mV  Impedance: 399 ohms   Threshold: 0.75 V @ 0.4 ms  Pacing: A: 88%  RV: <1%    Battery Voltage/Longevity:  4.6 years, charge time: 3.8 seconds    Arrhythmias: AF burden: <1%  -episodes of SVT-some falling into VT monitor zone based on HR (longest 3.5 minutes)  OptiVol: at baseline.     Overall device function is normal  All device programmable settings were evaluated per above and in the scanned document, along with iterative adjustments (capture thresholds) to assess and select the most appropriate final programming to provide for consistent delivery of the appropriate therapy and to verify function of the device. Impression:    1. ICMP  - EF: 35%  - repeat echo 7/10/16:  Ejection fraction biplane = 45%.  - NYHA II FC  - lisinopril 2.5 mg QD, digoxin 125 mcg QD, No beta blocker 2/2 intolerance  - follows with Dr Shawna Lechuga [facility])    2. Dual chamber ICD in situ  - Original implant: Medtronic. DOI 9/4/09  - ICD generator change 8/25/16: Medtronic Evera XT    3. H/O MI  - 2009    4. PAF  - [id number] score: 7, annual stroke risk 9.6%  - coumadin OAC  - Sotalol AAD therapy, decreased to daily due to CrCl (2/26/2020) >> patient resumed BID dosing on her own. - CrCl: 37.5 ml/min (2/26/2020) >> new labs pending  - QTc is stable. Lab Results   Component Value Date     02/13/2020    K 4.3 02/13/2020    CL 99 02/13/2020    CO2 27 02/13/2020    BUN 14 02/13/2020    CREATININE 0.9 02/13/2020    CREATININE 0.8 04/25/2019    GLUCOSE 96 02/13/2020    CALCIUM 9.0 02/13/2020       5. Hypothyroidism  - H/O thyroid CA  - Synthroid replacement    6. H/O breast CA  - s/p bilateral mastectomy    7. H/O TIA     8. DNR-CCA  - no intubation or CPR  - OK for medication and defibrillation   - patient to discuss further with family re: defibrillation        Plan:     Ms Petersen's ICD function is stable and programmed accordingly based on the above interrogation. She remains on Sotalol for arrhythmia suppression with a stable QTc. At her visit in 2/2020, based on CrCl (<40 mL/min) Sotalol dosing was decreased from BID to QD dosing. She resumed BID dosing on her own because she felt \"washed out\" on the lower dose. She states she informed cardiology; I do not have documentation available at this time. We discussed, at length, side effects and potential pro-arrhythmic effects. At this time she will continue BID dosing and accept the above with scheduled lab work pending.  Her ICD interrogation

## 2021-04-14 ENCOUNTER — OFFICE VISIT (OUTPATIENT)
Dept: NON INVASIVE DIAGNOSTICS | Age: 86
End: 2021-04-14
Payer: MEDICARE

## 2021-04-14 VITALS
RESPIRATION RATE: 16 BRPM | OXYGEN SATURATION: 97 % | HEIGHT: 63 IN | DIASTOLIC BLOOD PRESSURE: 62 MMHG | WEIGHT: 95.2 LBS | BODY MASS INDEX: 16.87 KG/M2 | SYSTOLIC BLOOD PRESSURE: 90 MMHG

## 2021-04-14 DIAGNOSIS — Z95.810 DUAL IMPLANTABLE CARDIOVERTER-DEFIBRILLATOR IN SITU: ICD-10-CM

## 2021-04-14 DIAGNOSIS — I25.5 CARDIOMYOPATHY, ISCHEMIC: Primary | Chronic | ICD-10-CM

## 2021-04-14 DIAGNOSIS — I48.0 PAROXYSMAL ATRIAL FIBRILLATION (HCC): ICD-10-CM

## 2021-04-14 PROCEDURE — 93000 ELECTROCARDIOGRAM COMPLETE: CPT | Performed by: NURSE PRACTITIONER

## 2021-04-14 PROCEDURE — G8400 PT W/DXA NO RESULTS DOC: HCPCS | Performed by: NURSE PRACTITIONER

## 2021-04-14 PROCEDURE — 99215 OFFICE O/P EST HI 40 MIN: CPT | Performed by: NURSE PRACTITIONER

## 2021-04-14 PROCEDURE — G8419 CALC BMI OUT NRM PARAM NOF/U: HCPCS | Performed by: NURSE PRACTITIONER

## 2021-04-14 PROCEDURE — 4040F PNEUMOC VAC/ADMIN/RCVD: CPT | Performed by: NURSE PRACTITIONER

## 2021-04-14 PROCEDURE — G8427 DOCREV CUR MEDS BY ELIG CLIN: HCPCS | Performed by: NURSE PRACTITIONER

## 2021-04-14 PROCEDURE — 93290 INTERROG DEV EVAL ICPMS IP: CPT | Performed by: NURSE PRACTITIONER

## 2021-04-14 PROCEDURE — 1036F TOBACCO NON-USER: CPT | Performed by: NURSE PRACTITIONER

## 2021-04-14 PROCEDURE — 1090F PRES/ABSN URINE INCON ASSESS: CPT | Performed by: NURSE PRACTITIONER

## 2021-04-14 PROCEDURE — 1123F ACP DISCUSS/DSCN MKR DOCD: CPT | Performed by: NURSE PRACTITIONER

## 2021-04-14 PROCEDURE — 93283 PRGRMG EVAL IMPLANTABLE DFB: CPT | Performed by: NURSE PRACTITIONER

## 2021-04-14 ASSESSMENT — ENCOUNTER SYMPTOMS: RESPIRATORY NEGATIVE: 1

## 2021-06-28 NOTE — PROGRESS NOTES
Remote transmission results:    Remote monitoring reviewed over a 90 day period.   End of 90 day monitoring period date of service 5/10/21    Make/Model MDT Amrita GRIGSBY DR  Mode MVPR 60/120 ppm  Presenting rhythm: SR, ApVs  P wave: 2.6 mV  Impedance: 399 ohms   Threshold: 0.625 V @ 0.4 ms  RV R wave: 8.8 mV  Impedance: 399 ohms   Threshold: 0.875 V @ 0.4 ms  Pacing: A: 96.9%  RV: <0.1%    Battery Voltage/Longevity: 4.7 years    Arrhythmias: none  -AF burden <0.1%  OptiVol: stable, baseline    Medications:  -Lanoxin  -Coumadin  -Synthroid  -Lisinopril  -Sotalol    Comment:  -ICMP  -PAF  -DNR-CCA: no intubation/CPR: discussing defibrillation with family  -follows with Dr Jennifer William  -recall 10/2021      REMY Moore-CNP, [address] [facility]